# Patient Record
Sex: FEMALE | Race: WHITE | NOT HISPANIC OR LATINO | Employment: FULL TIME | URBAN - METROPOLITAN AREA
[De-identification: names, ages, dates, MRNs, and addresses within clinical notes are randomized per-mention and may not be internally consistent; named-entity substitution may affect disease eponyms.]

---

## 2018-01-11 NOTE — PROGRESS NOTES
Assessment    1  Accelerated essential hypertension (401 0) (I10)    Plan  Accelerated essential hypertension    · A diet low in sodium and high in potassium, magnesium, and calcium can help your  blood pressure ; Status:Complete;   Done: 04PAL5179   · Begin a limited exercise program ; Status:Complete;   Done: 49BWD8284   · Continue with our present treatment plan ; Status:Complete;   Done: 34KFT9212   · Keep a diary of when and what you eat ; Status:Complete;   Done: 82RWQ1615   · Restrict the salt in your diet by avoiding highly salted foods ; Status:Complete;   Done:  49GVK7727   · Restrict your sodium (salt) intake to 2 grams per day ; Status:Complete;   Done:  83HFC4891   · Some eating tips that can help you lose weight ; Status:Complete;   Done: 80HNI3309   · Take your blood pressure twice a week  Record the numbers and bring them with you to  your appointments ; Status:Complete;   Done: 17BZT3258   · We recommend that you change your eating habits slowly ; Status:Complete;   Done:  13TTB7680   · We recommend that you follow the "Mediterranean diet "; Status:Complete;   Done:  63CVC4985   · You need to stop smoking  Though it is not easy, more than half of all adult smokers  have quit  We encourage you to write down all the reasons you should quit smoking and  set a quit date for yourself  Ask us how we can help  You may also call  1-800-QUIT-NOW for free resources and assistance ; Status:Complete;   Done:  81USO5318   · Call [phone] if: You become dizzy or lightheaded, especially when you stand up  after sitting for a while ; Status:Complete;   Done: 86KOF7606   · Call [phone] if: You develop double vision (see two of everything) ;  Status:Complete;   Done: 13IZD2059   · Call [phone] if: Your blood pressure is frequently higher than 140/90 ;  Status:Complete;   Done: 56FCO3395   · Call 911 if:  You experience a new kind of chest pain (angina) or pressure ;  Status:Complete;   Done: 91WCN8557   · Call 911 if: You have any symptoms of a stroke ; Status:Complete;   Done: 66VCV3779   · Seek Immediate Medical Attention if: You have a severe headache that will not go away ;  Status:Complete;   Done: 71KLS2967   · Seek Immediate Medical Attention if: Your blood pressure is greater than 250/120 for 2  consecutive readings ; Status:Complete;   Done: 09VEC4362    Discussion/Summary    For elevated blood pressure: you need to take your blood pressure regularly  Please take as soon as you get home today  Please check your blood pressure daily  Keep a log of your readings and bring them to you next appointment  Pt reports she does have a BP cuff at home and can take her BP  Please seek immediate medical attention for readings greater than 190 on the top and greater than 110 on the bottom  The patient was counseled regarding instructions for management, risk factor reductions, prognosis, patient and family education, impressions, risks and benefits of treatment options, importance of compliance with treatment  Possible side effects of new medications were reviewed with the patient/guardian today  The treatment plan was reviewed with the patient/guardian  The patient/guardian understands and agrees with the treatment plan      Provider Comments  Provider Comments:   Pt appears to be very inconsistent with taking medication, has many excuses why she forgets them  Educated on risk factors of uncontrolled HTN  Chief Complaint  Pt states that on Sunday, she had a "rush of warmth" , then shortly after vomited and did not feel good for the rest of the day -she was been feeling more hungry than usual, that she cant get full, and doesn't feel normal until about 2-3 hours after eating  She states that this is not normal for her  Overall, she states that she just doesn't feel right  History of Present Illness  HPI: Pt is here for HTN   She has been taking her meds for the past week after not taking them for some time  She did have an episode 5 days ago where she felt flushed and vomited  She has not any further episodes  She has been trying to reduce her caffeine intake  She is feeling better today but is concerned regarding her blood pressure  She does admit though that she has not taken her BP medication today  She notes she has been feeling much more hungry lately  Hypertension (Follow-Up): The patient presents for follow-up of essential hypertension  The patient states she has been doing poorly with her blood pressure control since the last visit  She has no comorbid illnesses  Symptoms: The patient is currently asymptomatic  Associated symptoms include headache and occasional HAs and flushing, but no focal neurologic deficits and no memory loss  Home monitoring: The patient is not checking blood pressure at home  Disease Management: the patient is not doing well with her blood pressure goals  Review of Systems    Constitutional: no fever, not feeling poorly, no chills and not feeling tired  ENT: no earache, no nosebleeds, no sore throat and no nasal discharge  Cardiovascular: no chest pain, no palpitations and no lower extremity edema  Respiratory: no shortness of breath and no cough  Gastrointestinal: vomiting, but as noted in HPI, no abdominal pain, no nausea, no constipation, no diarrhea and no blood in stools  Genitourinary: no dysuria  Musculoskeletal: no arthralgias and no myalgias  Integumentary: no rashes  Neurological: headache and occasional headache, but no numbness, no dizziness and no fainting  Active Problems    1  Accelerated essential hypertension (401 0) (I10)   2  Benign essential hypertension (401 1) (I10)   3  HTN (hypertension), malignant (401 0) (I10)   4  Hypercholesterolemia (272 0) (E78 0)   5  Iron deficiency anemia (280 9) (D50 9)   6   Tinea versicolor (111 0) (B36 0)    Past Medical History  Active Problems And Past Medical History Reviewed: The active problems and past medical history were reviewed and updated today  Surgical History  Surgical History Reviewed: The surgical history was reviewed and updated today  Social History    · Being A Social Drinker   · Denied: History of Drug Use   · Never A Smoker  The social history was reviewed and is unchanged  Family History  Family History Reviewed: The family history was reviewed and updated today  Current Meds   1  [employer] Vitamin C 500 MG Oral Tablet; Therapy: (Recorded:12Nov2013) to Recorded   2  Ferrous Sulfate 325 (65 Fe) MG Oral Tablet; TAKE 1 TABLET EVERY OTHER DAY WITH   FOOD  Requested for: 06RDH0493; Last Rx:22Jan2016 Ordered   3  Fluticasone Propionate 50 MCG/ACT Nasal Suspension; USE 1 TO 2 SPRAYS IN EACH   NOSTRIL ONCE DAILY; Therapy: 23BAC6823 to (Last Sylviath Maizes)  Requested for: 91NZE6152 Ordered   4  Lisinopril 40 MG Oral Tablet; TAKE 1 TABLET DAILY  Requested for: 68YQE0789; Last   Rx:22Jan2016 Ordered   5  Metoprolol Succinate ER 50 MG Oral Tablet Extended Release 24 Hour; TAKE 1 TABLET   DAILY; Therapy: 94XON9618 to (Coral Portillo)  Requested for: 35GSI6667; Last   Rx:27Nov2015 Ordered   6  Multivital-M Oral Tablet; Therapy: (Recorded:12Nov2013) to Recorded   7  Pantoprazole Sodium 20 MG Oral Tablet Delayed Release; Take 1 tablet by mouth once   a day,,;   Therapy: 33FXH7984 to (Coral Portillo)  Requested for: 52BSR0449; Last   Rx:27Nov2015 Ordered    The medication list was reviewed and updated today  Allergies    1  No Known Drug Allergies    2   Seasonal    Vitals   Recorded: 66HWF8519 05:01PM Recorded: 97RVO2227 04:36PM   Temperature  98 2 F, Oral   Heart Rate  66   Systolic 816, LUE, Sitting 152, LUE, Sitting   Diastolic 134, LUE, Sitting 100, LUE, Sitting   Height  5 ft 7 in   Weight  167 lb    BMI Calculated  26 16   BSA Calculated  1 87   O2 Saturation  99, RA     Physical Exam    Constitutional   General appearance: No acute distress, well appearing and well nourished  Eyes   Conjunctiva and lids: No swelling, erythema or discharge  Pupils and irises: Equal, round and reactive to light  Ears, Nose, Mouth, and Throat   External inspection of ears and nose: Normal     Oropharynx: Normal with no erythema, edema, exudate or lesions  MMM  Pulmonary   Respiratory effort: No increased work of breathing or signs of respiratory distress  Auscultation of lungs: Clear to auscultation  no rales or crackles were heard bilaterally  no rhonchi  no wheezing  Cardiovascular   Auscultation of heart: Normal rate and rhythm, normal S1 and S2, without murmurs  Examination of extremities for edema and/or varicosities: Normal     Abdomen   Abdomen: Non-tender, no masses  Lymphatic   Palpation of lymph nodes in neck: No lymphadenopathy  no anterior cervical node enlargement, no posterior cervical node enlargement, no submandibular node enlargement and no supraclavicular node enlargement  Musculoskeletal   Gait and station: Normal     Skin   Skin and subcutaneous tissue: Normal without rashes or lesions  Neurologic   Cranial nerves: Cranial nerves 2-12 intact  No focal sensory or motor deficits, CN 2-12 intact  Psychiatric   Orientation to person, place, and time: Normal     Mood and affect: Normal          Future Appointments    Date/Time Provider Specialty Site   03/14/2016 05:30 PM Rodrigo Jean MD Internal Medicine [facility]     Signatures   Electronically signed by :  GERARDO Chan; Feb 4 2016  6:37PM EST                       (Author)    Electronically signed by : Irma Briggs MD; Feb 5 2016  8:54AM EST                       (Validation)

## 2018-01-11 NOTE — PROGRESS NOTES
Assessment    1  Accelerated essential hypertension (401 0) (I10)   2  Iron deficiency anemia (280 9) (D50 9)   3  Screening for depression (V79 0) (Z13 89)   4  Hypercholesterolemia (272 0) (E78 0)    Plan  Benign essential hypertension    · Lisinopril 40 MG Oral Tablet; TAKE 1 TABLET DAILY  Hypercholesterolemia    · Follow-up visit in 1 month Evaluation and Treatment  Follow-up  Status: Hold For - Scheduling   Requested for: 21Jan2016  Iron deficiency anemia    · Ferrous Sulfate 325 (65 Fe) MG Oral Tablet; TAKE 1 TABLET EVERY OTHER DAY WITH  FOOD  Screening for depression    · *VB-Depression Screening; Status:Complete - Retrospective By Protocol Authorization;   Done:  88WBR6109 04:46PM    Discussion/Summary  Discussion Summary:   The patient was encouraged to maintain compliance with her medications  She was told to take her medications at once when she gets home from the office and to begin taking them on a regular basis every morning  She gives a myriad of excuses as to why she has been unable to take her medications  The patient was counseled as to the consequences of noncompliance with her blood pressure medications in the possible complications such as strokes heart attacks microvascular disease of the brain, peripheral vascular complications and the like  She will have a followup visit in one month to assess her blood pressure control  There were no other changes to her medications at this time  Chief Complaint  Chief Complaint Free Text Note Form: Pt  here to f/u HTN and anemia  review b/w results           History of Present Illness  HPI: Is returns to the office for followup visit for hypertension  She's had some adjustments to her medications but admits to be noncompliant with her medication  She denies any symptoms other than a very mild headache  She has no visual symptoms he denies any weakness, numbness, tingling of her extremities she denies any speech problem or facial asymmetry   She had some blood work done as followup for iron deficiency anemia which will be reviewed  Her hemoglobin was 11 5 with normocytic normochromic indices  Metabolic panel is normal fasting glucose was normal  Her lipid profile revealed a mildly elevated cholesterol at 231 and hypertriglyceridemia of 259      Review of Systems  PHQ-9 Depression Scale: Over the past 2 weeks, how often have you been bothered by the following problems? 1 ) Little interest or pleasure in doing things? Several days  2 ) Feeling down, depressed or hopeless? Not at all    3 ) Trouble falling asleep or sleeping too much? Several days  4 ) Feeling tired or having little energy? Nearly every day  5 ) Poor appetite or overeating? Several days  6 ) Feeling bad about yourself, or that you are a failure, or have let yourself or your family down? Not at all    7 ) Trouble concentrating on things, such as reading a newspaper or watching television? Several days  8 ) Moving or speaking so slowly that other people could have noticed, or the opposite, moving or speaking faster than usual? Several days  9 ) Thoughts that you would be better off dead or of hurting yourself in some way? 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)   5  History of Chest tightness or pressure (786 59) (R07 89)   6  History of Encounter for screening mammogram for malignant neoplasm of breast (V76 12) (Z12 31)   7  History of acute bronchitis (V12 69) (Z87 09)   8  History of allergy (V15 09) (Z88 9)   9   History of heartburn (V12 79) (Z87 898)   10  History of hypertension (V12 59) (Z86 79)   11  History of tinea corporis (V12 09) (Z86 19)   12  History of Influenza vaccination declined (V64 06) (Z28 21)   13  History of Screening for breast cancer (V76 10) (Z12 39)   14  History of Screening for lipoid disorders (V77 91) (Z13 220)  Active Problems And Past Medical History Reviewed: The active problems and past medical history were reviewed and updated today  Surgical History    1  History of Tonsillectomy With Adenoidectomy  Surgical History Reviewed: The surgical history was reviewed and updated today  Family History    1  Family history of Coronary Artery Disease (V17 49)    2  Family history of Coronary Artery Disease (V17 49)    3  Family history of Breast Cancer (V16 3)   4  Family history of Family Health Status 2  Children Living    5  Family history of Cardiovascular Disorder (V17 49)  Family History Reviewed: The family history was reviewed and updated today  Social History    · Being A Social Drinker   · Denied: History of Drug Use   · Never A Smoker  Social History Reviewed: The social history was reviewed and is unchanged  Current Meds   1  [employer]S Vitamin C 500 MG Oral Tablet; Therapy: (Recorded:12Nov2013) to Recorded   2  Ferrous Sulfate 325 (65 Fe) MG Oral Tablet; TAKE 1 TABLET EVERY OTHER DAY WITH FOOD; Last   Rx:24Mar2014 Ordered   3  Fluticasone Propionate 50 MCG/ACT Nasal Suspension; USE 1 TO 2 SPRAYS IN EACH NOSTRIL   ONCE DAILY; Therapy: 11BJI6782 to (Osmany North)  Requested for: 84GJQ1170 Ordered   4  Lisinopril 40 MG Oral Tablet; TAKE 1 TABLET DAILY  Requested for: 11Aug2015; Last Rx:11Aug2015   Ordered   5  Metoprolol Succinate ER 50 MG Oral Tablet Extended Release 24 Hour; TAKE 1 TABLET DAILY; Therapy: 79YIL5520 to (Dimitrios Mendez)  Requested for: 95MPG6500; Last Rx:27Nov2015   Ordered   6  Multivital-M Oral Tablet;    Therapy: (Recorded:12Nov2013) to Recorded   7  Pantoprazole Sodium 20 MG Oral Tablet Delayed Release; Take 1 tablet by mouth once a day,,;   Therapy: 86YWQ6046 to (Carlton Lazaro)  Requested for: 31KJA4067; Last Rx:27Nov2015   Ordered  Medication List Reviewed: The medication list was reviewed and updated today  Allergies    1  No Known Drug Allergies    2  No Known Environmental Allergies   3  No Known Food Allergies    Vitals  Vital Signs [Data Includes: Current Encounter]    Recorded: 21Jan2016 04:37PM   Temperature 98 2 F, Oral   Heart Rate 73   Systolic 642, LUE, Sitting   Diastolic 428, LUE, Sitting   Weight 168 lb    BMI Calculated 27 12   BSA Calculated 1 86   O2 Saturation 98     Physical Exam    Constitutional   General appearance: No acute distress, well appearing and well nourished  Eyes   Conjunctiva and lids: No swelling, erythema or discharge  Pupils and irises: Equal, round and reactive to light  Ears, Nose, Mouth, and Throat   External inspection of ears and nose: Normal     Pulmonary   Respiratory effort: No increased work of breathing or signs of respiratory distress  Auscultation of lungs: Clear to auscultation  Cardiovascular   Auscultation of heart: Normal rate and rhythm, normal S1 and S2, without murmurs  Examination of extremities for edema and/or varicosities: Normal     Carotid pulses: Normal     Abdomen   Abdomen: Non-tender, no masses  Lymphatic   Palpation of lymph nodes in neck: No lymphadenopathy  Musculoskeletal   Gait and station: Normal     Neurologic   Cranial nerves: Cranial nerves 2-12 intact  No focal sensory or motor deficits     Psychiatric   Orientation to person, place, and time: Normal     Mood and affect: Normal          Results/Data  Encounter Results   *VB-Depression Screening 21Jan2016 04:46PM Marybeth Schilder     Test Name Result Flag Reference   Depression Scale Result      Depression Screen - Positive Findings       Future Appointments    Date/Time Provider Specialty Site   03/14/2016 05:30 PM Augusto Gaines MD Internal Medicine [facility]     Signatures   Electronically signed by : Narciso Ceballos MD; Jan 21 2016  5:44PM EST                       (Author)

## 2018-01-31 ENCOUNTER — OFFICE VISIT (OUTPATIENT)
Dept: INTERNAL MEDICINE CLINIC | Facility: CLINIC | Age: 48
End: 2018-01-31
Payer: COMMERCIAL

## 2018-01-31 VITALS
OXYGEN SATURATION: 98 % | BODY MASS INDEX: 26.62 KG/M2 | TEMPERATURE: 97.6 F | SYSTOLIC BLOOD PRESSURE: 164 MMHG | DIASTOLIC BLOOD PRESSURE: 98 MMHG | WEIGHT: 169.6 LBS | HEIGHT: 67 IN | HEART RATE: 57 BPM

## 2018-01-31 DIAGNOSIS — I10 BENIGN ESSENTIAL HYPERTENSION: Primary | ICD-10-CM

## 2018-01-31 DIAGNOSIS — E78.00 HYPERCHOLESTEROLEMIA: ICD-10-CM

## 2018-01-31 DIAGNOSIS — D50.9 IRON DEFICIENCY ANEMIA, UNSPECIFIED IRON DEFICIENCY ANEMIA TYPE: ICD-10-CM

## 2018-01-31 DIAGNOSIS — B36.0 TINEA VERSICOLOR: ICD-10-CM

## 2018-01-31 PROCEDURE — 99214 OFFICE O/P EST MOD 30 MIN: CPT | Performed by: PHYSICIAN ASSISTANT

## 2018-01-31 RX ORDER — HYDROCHLOROTHIAZIDE 25 MG/1
25 TABLET ORAL DAILY
Qty: 30 TABLET | Refills: 1 | Status: SHIPPED | OUTPATIENT
Start: 2018-01-31 | End: 2018-04-05 | Stop reason: SDUPTHER

## 2018-01-31 RX ORDER — METOPROLOL SUCCINATE 50 MG/1
1 TABLET, EXTENDED RELEASE ORAL DAILY
COMMUNITY
Start: 2015-11-27 | End: 2018-01-31 | Stop reason: SDUPTHER

## 2018-01-31 RX ORDER — KETOCONAZOLE 20 MG/G
CREAM TOPICAL DAILY
Qty: 15 G | Refills: 0 | Status: SHIPPED | OUTPATIENT
Start: 2018-01-31 | End: 2018-04-05

## 2018-01-31 RX ORDER — LISINOPRIL 40 MG/1
1 TABLET ORAL DAILY
COMMUNITY
End: 2018-01-31 | Stop reason: SDUPTHER

## 2018-01-31 RX ORDER — LISINOPRIL 40 MG/1
40 TABLET ORAL DAILY
Qty: 30 TABLET | Refills: 0 | Status: SHIPPED | OUTPATIENT
Start: 2018-01-31 | End: 2018-03-31 | Stop reason: SDUPTHER

## 2018-01-31 RX ORDER — METOPROLOL SUCCINATE 50 MG/1
50 TABLET, EXTENDED RELEASE ORAL DAILY
Qty: 30 TABLET | Refills: 0 | Status: SHIPPED | OUTPATIENT
Start: 2018-01-31 | End: 2018-04-05 | Stop reason: SDUPTHER

## 2018-01-31 NOTE — ASSESSMENT & PLAN NOTE
BP check in 1 week  Discussed at length compliance with BP medications  Add HCTZ 25 mg daily along with lisinopril 40 mg daily and metoprolol 50 mg daily  Do not use any OTC medications which can increase Bp  Discussed red flag sx and ER precautions which need immediate eval and treat

## 2018-01-31 NOTE — PATIENT INSTRUCTIONS
Benign essential hypertension = BP check in 1 week  Discussed at length compliance with BP medications  Add HCTZ 25 mg daily along with lisinopril 40 mg daily and metoprolol 50 mg daily  Do not use any OTC medications which can increase Bp  Discussed red flag sx and ER precautions which need immediate eval and treat

## 2018-01-31 NOTE — PROGRESS NOTES
Assessment/Plan:    Hypercholesterolemia  No recent lipids, will check lipids to further evaluate  Tinea versicolor  Patient notes she previously required oral antifungals for treatment of tinea  Due to not having recent labs discussed that I will Rx topical however before I consider oral agents I first need her to go for labs to evaluate renal and liver function  Patient aware and plans to go for labs  Iron deficiency anemia  Prior hx of Fe deficient anemia, not currently on iron supplement, will start with CBC to evaluate for anemia or microcytosis  May need iron studies to follow  Benign essential hypertension  Accellerated HTN in office today  Compliance with oral BP agents is questionable  Prior difficulties with Norvasc causing edema  Reviewed medicaitons and discussed importnace of compliance and BP control with patient at length  Avoid OTC meds, ETOH which can increase Bp  [facility] BP check in 1 week  Add HCTZ 25 mg daily along with lisinopril 40 mg daily and metoprolol 50 mg daily  Go for labs to further evalute renal function  Will add UA as well  Discussed at length the risks of poorly controlled BP including CVA/retinopathy/nephropathy  At this time no focal findings  Discussed red flag sx and ER precautions which need immediate eval and treat  1 week [facility] follow up  Diagnoses and all orders for this visit:    Benign essential hypertension  -     hydrochlorothiazide (HYDRODIURIL) 25 mg tablet; Take 1 tablet (25 mg total) by mouth daily  -     Urinalysis with microscopic  -     Comprehensive metabolic panel; Future  -     TSH, 3rd generation with T4 reflex; Future  -     lisinopril (ZESTRIL) 40 mg tablet; Take 1 tablet (40 mg total) by mouth daily  -     metoprolol succinate (TOPROL-XL) 50 mg 24 hr tablet;  Take 1 tablet (50 mg total) by mouth daily  -     Comprehensive metabolic panel  -     TSH, 3rd generation with T4 reflex    Tinea versicolor  -     ketoconazole (NIZORAL) 2 % cream; Apply topically daily    Hypercholesterolemia  -     Lipid Panel with Direct LDL reflex; Future  -     Lipid Panel with Direct LDL reflex    Iron deficiency anemia, unspecified iron deficiency anemia type  -     CBC and differential; Future  -     CBC and differential    Other orders  -     Discontinue: metoprolol succinate (TOPROL-XL) 50 mg 24 hr tablet; Take 1 tablet by mouth daily  -     Discontinue: lisinopril (ZESTRIL) 40 mg tablet; Take 1 tablet by mouth daily          Subjective:          Patient ID: Viraj Alvarez is a 50 y o  female  49 yo feamle notes she has hx of tinea and gets it several times per year causing itchign on the area  Occurs throughout her body  Hx of poorly controlled BP for some time  Notes that she does not take BP meds often  Notes she did not take BP medications today  Notes she often forgets her BP medications and BP is always poorly controlled  Notes it has been much higher in the past without causing her any symptoms  Notes she was told in the past she is at increased risk due to her Bp  Has not had labs in some time  Needs refills of her meds, notes they last longer because she does not take them regularly  Feels fine at this time, no complaints or concerns  Hypertension   This is a chronic problem  The current episode started more than 1 year ago  The problem has been waxing and waning (changes pending her compliance with medications ) since onset  The problem is uncontrolled  Pertinent negatives include no anxiety, blurred vision, chest pain, headaches, malaise/fatigue, orthopnea, palpitations, peripheral edema (Noted prior was given norvasc which was d/c due to LE edema ) or shortness of breath  Associated agents: Notes she does not take OTC supplements or meds  Risk factors for coronary artery disease include family history and dyslipidemia  Past treatments include ACE inhibitors and beta blockers  Compliance problems: forgets to take them  There is no history of angina, kidney disease, CAD/MI, CVA, heart failure, retinopathy or a thyroid problem  There is no history of chronic renal disease  Rash   Chronicity: gets tinea on abdomen regularly  The current episode started more than 1 year ago  The problem has been waxing and waning since onset  The affected locations include the abdomen, chest and torso  The rash is characterized by itchiness  She was exposed to nothing  Pertinent negatives include no cough, diarrhea, fever, shortness of breath or vomiting  Treatments tried: Topical does not work  NOtes she needs oral meds to tx this  The treatment provided no relief  Her past medical history is significant for allergies (seasonal)  The following portions of the patient's history were reviewed and updated as appropriate: allergies, current medications, past family history, past medical history, past social history, past surgical history and problem list     Review of Systems   Constitutional: Negative for fever and malaise/fatigue  HENT: Positive for hearing loss (hx of decreased hearing L ear since prior surgery  )  Eyes: Negative for blurred vision and visual disturbance  Respiratory: Negative for cough, chest tightness, shortness of breath and wheezing  Cardiovascular: Negative for chest pain, palpitations and orthopnea  Gastrointestinal: Negative for abdominal pain, diarrhea, nausea and vomiting  Skin: Positive for rash (as noted in HPI)  Neurological: Negative for dizziness, syncope, speech difficulty, weakness, light-headedness, numbness and headaches  Psychiatric/Behavioral: Negative for agitation           Past Medical History:   Diagnosis Date    Benign neoplasm of parotid gland     left    Hypertension          Current Outpatient Prescriptions:     lisinopril (ZESTRIL) 40 mg tablet, Take 1 tablet (40 mg total) by mouth daily, Disp: 30 tablet, Rfl: 0    metoprolol succinate (TOPROL-XL) 50 mg 24 hr tablet, Take 1 tablet (50 mg total) by mouth daily, Disp: 30 tablet, Rfl: 0    hydrochlorothiazide (HYDRODIURIL) 25 mg tablet, Take 1 tablet (25 mg total) by mouth daily, Disp: 30 tablet, Rfl: 1    ketoconazole (NIZORAL) 2 % cream, Apply topically daily, Disp: 15 g, Rfl: 0    Allergies   Allergen Reactions    Other      seasonal       Social History   Past Surgical History:   Procedure Laterality Date     SECTION      EAR SURGERY      SALIVARY GLAND SURGERY      TONSILECTOMY AND ADNOIDECTOMY       Family History   Problem Relation Age of Onset    Coronary artery disease Mother      age 52    Coronary artery disease Father      age 63    Breast cancer Sister      age 52    Heart disease Family        Objective:  /98   Pulse 57   Temp 97 6 °F (36 4 °C) (Oral)   Ht 5' 6 5" (1 689 m)   Wt 76 9 kg (169 lb 9 6 oz)   SpO2 98%   BMI 26 96 kg/m²   Body mass index is 26 96 kg/m²  Physical Exam   Constitutional: She is oriented to person, place, and time  She appears well-developed and well-nourished  No distress  HENT:   Head: Normocephalic and atraumatic  Mouth/Throat: Oropharynx is clear and moist  No oropharyngeal exudate  Eyes: Pupils are equal, round, and reactive to light  Right eye exhibits no discharge  Left eye exhibits no discharge  No scleral icterus  Neck: Neck supple  Cardiovascular: Regular rhythm and normal heart sounds  No murmur heard  Bradycardic rate  Pulmonary/Chest: Effort normal and breath sounds normal  No respiratory distress  She has no wheezes  Abdominal: Soft  Bowel sounds are normal  There is no tenderness  There is no guarding  Musculoskeletal: She exhibits no edema  Neurological: She is alert and oriented to person, place, and time  No cranial nerve deficit  Coordination normal    No gross focal neuro deficits on exam   Skin: Skin is warm and dry  She is not diaphoretic    + hypopigmented patches throughout anterior chest, trunk below breasts  Puritic  Psychiatric: She has a normal mood and affect  Her behavior is normal  Thought content normal  Her mood appears not anxious  Her affect is not angry  Her speech is not rapid and/or pressured and not slurred  She is not agitated  She does not exhibit a depressed mood  Nursing note and vitals reviewed

## 2018-02-01 NOTE — ASSESSMENT & PLAN NOTE
Patient notes she previously required oral antifungals for treatment of tinea  Due to not having recent labs discussed that I will Rx topical however before I consider oral agents I first need her to go for labs to evaluate renal and liver function  Patient aware and plans to go for labs

## 2018-02-01 NOTE — ASSESSMENT & PLAN NOTE
Accellerated HTN in office today  Compliance with oral BP agents is questionable  Prior difficulties with Norvasc causing edema  Reviewed medicaitons and discussed importnace of compliance and BP control with patient at length  Avoid OTC meds, ETOH which can increase Bp  [facility] follow up BP check in 1 week  Add HCTZ 25 mg daily along with lisinopril 40 mg daily and metoprolol 50 mg daily  Go for labs to further evalute renal function  Will add UA as well  Discussed at length the risks of poorly controlled BP including CVA/retinopathy/nephropathy  At this time no focal findings  Discussed red flag sx and ER precautions which need immediate eval and treat  1 week [facility] follow up

## 2018-02-01 NOTE — ASSESSMENT & PLAN NOTE
Prior hx of Fe deficient anemia, not currently on iron supplement, will start with CBC to evaluate for anemia or microcytosis  May need iron studies to follow

## 2018-03-16 ENCOUNTER — TELEPHONE (OUTPATIENT)
Dept: INTERNAL MEDICINE CLINIC | Age: 48
End: 2018-03-16

## 2018-03-16 NOTE — TELEPHONE ENCOUNTER
[facility]  She is at work and on the phone  The best time to reach her would be 11:30-12:30 when she is on lunch break  Thank you

## 2018-03-23 DIAGNOSIS — I10 BENIGN ESSENTIAL HYPERTENSION: ICD-10-CM

## 2018-03-23 RX ORDER — LISINOPRIL 40 MG/1
40 TABLET ORAL DAILY
Qty: 30 TABLET | Refills: 0 | OUTPATIENT
Start: 2018-03-23

## 2018-03-23 NOTE — TELEPHONE ENCOUNTER
Automated refill request received for this patient  Please contact patient and    1  Verify that patient is taking this medication (verify with patient and with prior records)  2  Verify the patients dose for the medication  3   Verify that patient needs refills  Once this is done and if rx needed please resend me the rx  Please let me know if any questions      Thanks   Josiah Carvajal

## 2018-03-31 DIAGNOSIS — I10 BENIGN ESSENTIAL HYPERTENSION: ICD-10-CM

## 2018-04-03 RX ORDER — LISINOPRIL 40 MG/1
40 TABLET ORAL DAILY
Qty: 30 TABLET | Refills: 0 | Status: SHIPPED | OUTPATIENT
Start: 2018-04-03 | End: 2018-04-05 | Stop reason: SDUPTHER

## 2018-04-05 ENCOUNTER — OFFICE VISIT (OUTPATIENT)
Dept: INTERNAL MEDICINE CLINIC | Facility: CLINIC | Age: 48
End: 2018-04-05
Payer: COMMERCIAL

## 2018-04-05 VITALS
SYSTOLIC BLOOD PRESSURE: 136 MMHG | HEIGHT: 67 IN | HEART RATE: 50 BPM | WEIGHT: 170.4 LBS | BODY MASS INDEX: 26.74 KG/M2 | DIASTOLIC BLOOD PRESSURE: 80 MMHG | TEMPERATURE: 97.3 F | OXYGEN SATURATION: 97 %

## 2018-04-05 DIAGNOSIS — Z12.31 ENCOUNTER FOR SCREENING MAMMOGRAM FOR MALIGNANT NEOPLASM OF BREAST: ICD-10-CM

## 2018-04-05 DIAGNOSIS — E78.5 DYSLIPIDEMIA (HIGH LDL; LOW HDL): ICD-10-CM

## 2018-04-05 DIAGNOSIS — I10 BENIGN ESSENTIAL HYPERTENSION: Primary | ICD-10-CM

## 2018-04-05 DIAGNOSIS — B36.0 TINEA VERSICOLOR: ICD-10-CM

## 2018-04-05 DIAGNOSIS — D50.9 MICROCYTIC ANEMIA: ICD-10-CM

## 2018-04-05 DIAGNOSIS — R20.2 PARESTHESIA: ICD-10-CM

## 2018-04-05 PROCEDURE — 1036F TOBACCO NON-USER: CPT | Performed by: PHYSICIAN ASSISTANT

## 2018-04-05 PROCEDURE — 3075F SYST BP GE 130 - 139MM HG: CPT | Performed by: PHYSICIAN ASSISTANT

## 2018-04-05 PROCEDURE — 99214 OFFICE O/P EST MOD 30 MIN: CPT | Performed by: PHYSICIAN ASSISTANT

## 2018-04-05 PROCEDURE — 3079F DIAST BP 80-89 MM HG: CPT | Performed by: PHYSICIAN ASSISTANT

## 2018-04-05 RX ORDER — HYDROCHLOROTHIAZIDE 25 MG/1
25 TABLET ORAL DAILY
Qty: 30 TABLET | Refills: 2 | Status: SHIPPED | OUTPATIENT
Start: 2018-04-05 | End: 2018-07-15 | Stop reason: SDUPTHER

## 2018-04-05 RX ORDER — FERROUS SULFATE TAB EC 324 MG (65 MG FE EQUIVALENT) 324 (65 FE) MG
324 TABLET DELAYED RESPONSE ORAL
Qty: 30 TABLET | Refills: 2 | Status: SHIPPED | OUTPATIENT
Start: 2018-04-05 | End: 2019-03-20

## 2018-04-05 RX ORDER — METOPROLOL SUCCINATE 50 MG/1
50 TABLET, EXTENDED RELEASE ORAL DAILY
Qty: 30 TABLET | Refills: 2 | Status: SHIPPED | OUTPATIENT
Start: 2018-04-05 | End: 2018-08-28 | Stop reason: SDUPTHER

## 2018-04-05 RX ORDER — LISINOPRIL 40 MG/1
40 TABLET ORAL DAILY
Qty: 30 TABLET | Refills: 2 | Status: SHIPPED | OUTPATIENT
Start: 2018-04-05 | End: 2019-03-18 | Stop reason: SDUPTHER

## 2018-04-05 RX ORDER — ASCORBIC ACID 500 MG
500 TABLET ORAL DAILY
Qty: 30 TABLET | Refills: 2 | Status: SHIPPED | OUTPATIENT
Start: 2018-04-05 | End: 2019-03-20

## 2018-04-05 RX ORDER — FLUCONAZOLE 100 MG/1
100 TABLET ORAL DAILY
Qty: 7 TABLET | Refills: 0 | Status: SHIPPED | OUTPATIENT
Start: 2018-04-05 | End: 2018-04-12

## 2018-04-05 NOTE — ASSESSMENT & PLAN NOTE
Labs reviewed  Since last visit blood pressure is much improved with the addition of hydrochlorothiazide to patient's lisinopril 40 mg daily and metoprolol 50 mg daily  Continue same meds, no BP med changes at this time      Continue low-salt diet

## 2018-04-05 NOTE — ASSESSMENT & PLAN NOTE
Patient has not recently had a mammogram   Discussed at length the importance of screening mammogram   Patient also has a sister with a history of breast cancer  Which is another reason why it is very important patient gets her mammogram due to a family history  Order given

## 2018-04-05 NOTE — PROGRESS NOTES
[facility]  Standard Office Visit  Patient ID: Eunice Georgia    : 1970  Age/Gender: 50 y o  female     DATE: 2018      Assessment/Plan:    Dyslipidemia (high LDL; low HDL)   Reviewed lipids  ASCVD score 2 9% does not need statin at this time  Encouraged low-fat low-cholesterol diet    Benign essential hypertension   Labs reviewed  Since last visit blood pressure is much improved with the addition of hydrochlorothiazide to patient's lisinopril 40 mg daily and metoprolol 50 mg daily  Continue same meds, no BP med changes at this time  Continue low-salt diet    Microcytic anemia  Advised her to schedule GYN followup due to irregular menses, discussed likely due to perimenopausal period  Will check N75 and folic acid along with iron panel and CBC to be done in next 3 weeks  Encounter for screening mammogram for malignant neoplasm of breast   Patient has not recently had a mammogram   Discussed at length the importance of screening mammogram   Patient also has a sister with a history of breast cancer  Which is another reason why it is very important patient gets her mammogram due to a family history  Order given  Tinea versicolor  Start diflucan 100mg daily for 7 days  LFTs normal on last lab set  Diagnoses and all orders for this visit:    Benign essential hypertension  -     hydrochlorothiazide (HYDRODIURIL) 25 mg tablet; Take 1 tablet (25 mg total) by mouth daily  -     lisinopril (ZESTRIL) 40 mg tablet; Take 1 tablet (40 mg total) by mouth daily  -     metoprolol succinate (TOPROL-XL) 50 mg 24 hr tablet; Take 1 tablet (50 mg total) by mouth daily    Encounter for screening mammogram for malignant neoplasm of breast  -     Mammo screening bilateral w cad; Future    Microcytic anemia  -     Iron Panel; Future  -     Ferritin; Future  -     CBC and differential; Future  -     ferrous sulfate 324 (65 Fe) mg;  Take 1 tablet (324 mg total) by mouth daily before breakfast  -     ascorbic acid (VITAMIN C) 500 MG tablet; Take 1 tablet (500 mg total) by mouth daily  -     Vitamin B12; Future  -     Folate; Future    Dyslipidemia (high LDL; low HDL)    Tinea versicolor  -     fluconazole (DIFLUCAN) 100 mg tablet; Take 1 tablet (100 mg total) by mouth daily for 7 days    Paresthesia  -     Vitamin B12; Future  -     Folate; Future          Subjective:   Chief Complaint   Patient presents with    Follow-up     review blood work    Hypertension         Steven Alegre is a 50 y o  female who presents to the office on 4/5/2018 for     75-year-old female for follow-up  Since last visit  Has been taking her blood pressure medications as directed  Here today to discuss blood pressure    Notes since last visit she went for her labs  She is not currently taking iron supplementation but previously had been  She notes she may be due for her gyn follow-up complaints to reschedule  Menses have become somewhat more irregular as of late  She notes that she works as a    History of a previous fracture to her right wrist   Notes that she does get intermittent numbness and tingling in her fingers which come and go  No weakness associated with it  Does not wake her up from her sleep at night  Last occurred several days ago only last moments and improves with  Hand repositioning  Hypertension   This is a chronic problem  The current episode started more than 1 year ago  The problem has been gradually improving since onset  The problem is controlled  Pertinent negatives include no blurred vision, chest pain, palpitations, peripheral edema or shortness of breath  There are no associated agents to hypertension  Past treatments include ACE inhibitors and beta blockers  The current treatment provides moderate improvement  There is no history of angina, kidney disease or CAD/MI         The following portions of the patient's history were reviewed and updated as appropriate: allergies, current medications, past family history, past medical history, past social history, past surgical history and problem list     Review of Systems   Eyes: Negative for blurred vision  Respiratory: Negative for cough, shortness of breath and wheezing  Cardiovascular: Negative for chest pain and palpitations  Gastrointestinal: Negative for abdominal distention, constipation, diarrhea, nausea and vomiting  Musculoskeletal:        Numbness and tingling in her index and thumb bilateral hands intermittent worse with increased activity  Skin:        Note she has a history of tinea versicolor  Tried several topical medications with limited success  Needs oral medications which she had used in the past    Neurological: Negative for dizziness           Patient Active Problem List   Diagnosis    Benign essential hypertension    Microcytic anemia    Tinea versicolor    Dyslipidemia (high LDL; low HDL)    Encounter for screening mammogram for malignant neoplasm of breast       Past Medical History:   Diagnosis Date    Benign neoplasm of parotid gland     left    Hypertension        Past Surgical History:   Procedure Laterality Date     SECTION      EAR SURGERY      SALIVARY GLAND SURGERY      TONSILECTOMY AND ADNOIDECTOMY           Current Outpatient Prescriptions:     hydrochlorothiazide (HYDRODIURIL) 25 mg tablet, Take 1 tablet (25 mg total) by mouth daily, Disp: 30 tablet, Rfl: 2    lisinopril (ZESTRIL) 40 mg tablet, Take 1 tablet (40 mg total) by mouth daily, Disp: 30 tablet, Rfl: 2    metoprolol succinate (TOPROL-XL) 50 mg 24 hr tablet, Take 1 tablet (50 mg total) by mouth daily, Disp: 30 tablet, Rfl: 2    ascorbic acid (VITAMIN C) 500 MG tablet, Take 1 tablet (500 mg total) by mouth daily, Disp: 30 tablet, Rfl: 2    ferrous sulfate 324 (65 Fe) mg, Take 1 tablet (324 mg total) by mouth daily before breakfast, Disp: 30 tablet, Rfl: 2   fluconazole (DIFLUCAN) 100 mg tablet, Take 1 tablet (100 mg total) by mouth daily for 7 days, Disp: 7 tablet, Rfl: 0    Allergies   Allergen Reactions    Other      seasonal       Social History     Social History    Marital status: /Civil Union     Spouse name: N/A    Number of children: N/A    Years of education: N/A     Social History Main Topics    Smoking status: Never Smoker    Smokeless tobacco: None    Alcohol use Yes      Comment: social    Drug use: No    Sexual activity: Not Asked     Other Topics Concern    None     Social History Narrative    None       Family History   Problem Relation Age of Onset    Coronary artery disease Mother      age 52    Coronary artery disease Father      age 61    Breast cancer Sister      age 52    Heart disease Family        PHQ-9 Depression Screening    PHQ-9:    Frequency of the following problems over the past two weeks:       Little interest or pleasure in doing things:  0 - not at all  Feeling down, depressed, or hopeless:  0 - not at all  PHQ-2 Score:  0         Health Maintenance   Topic Date Due    HIV SCREENING  1970    MAMMOGRAM  1970    PAP SMEAR  1970    Depression Screening PHQ-9  11/08/1980    DTaP,Tdap,and Td Vaccines (1 - Tdap) 11/08/1989    INFLUENZA VACCINE  09/01/2017       There is no immunization history for the selected administration types on file for this patient  Objective:  Vitals:    04/05/18 1756   BP: 136/80   BP Location: Left arm   Patient Position: Sitting   Cuff Size: Adult   Pulse: (!) 50   Temp: (!) 97 3 °F (36 3 °C)   TempSrc: Oral   SpO2: 97%   Weight: 77 3 kg (170 lb 6 4 oz)   Height: 5' 7" (1 702 m)     Wt Readings from Last 3 Encounters:   04/05/18 77 3 kg (170 lb 6 4 oz)   01/31/18 76 9 kg (169 lb 9 6 oz)   11/07/16 77 3 kg (170 lb 6 1 oz)     Body mass index is 26 69 kg/m²  No exam data present       Physical Exam   Constitutional: She is oriented to person, place, and time   She appears well-developed and well-nourished  No distress  Alert pleasant cooperative seated in room in no acute distress   HENT:   Head: Normocephalic and atraumatic  Right Ear: External ear normal    Mouth/Throat: Oropharynx is clear and moist  No oropharyngeal exudate  Postsurgical changes left  TM   Eyes: Conjunctivae are normal  Right eye exhibits no discharge  Left eye exhibits no discharge  No scleral icterus  Neck: Neck supple  Cardiovascular: Normal rate, regular rhythm and normal heart sounds  No murmur heard  Pulmonary/Chest: Effort normal and breath sounds normal  No respiratory distress  She has no wheezes  Clear to auscultation no crackles rhonchi or wheeze no respiratory distress   Abdominal: Soft  Bowel sounds are normal  She exhibits no distension  There is no tenderness  Musculoskeletal: She exhibits no edema  No gross focal neurologic deficits on exam   Symmetric  strength  Negative Phalen's  Negative Tinel's  Neurological: She is alert and oriented to person, place, and time  Skin: Skin is warm and dry  She is not diaphoretic  Hyperpigmented patches on trunk  Scattered diffuse   Psychiatric: She has a normal mood and affect  Her behavior is normal  Judgment and thought content normal    Nursing note and vitals reviewed            Future Appointments  Date Time Provider Briseida Olson   7/17/2018 5:30 PM Desirae Garcia PA-C [facility] [address]    Patient Care Team:  Yoel Ellis DO as PCP - General

## 2018-04-05 NOTE — ASSESSMENT & PLAN NOTE
Reviewed lipids  ASCVD score 2 9% does not need statin at this time    Encouraged low-fat low-cholesterol diet

## 2018-04-05 NOTE — PATIENT INSTRUCTIONS
Dyslipidemia (high LDL; low HDL)  ASCVD score 2 9% does not need statin at this time  Benign essential hypertension  BP improved at this time on medications  Improved with addition of HCTZ to lisinopril and metoprolol  NO med changed at this time  Continue same meds  Microcytic anemia  Advised her to schedule GYN followup due to irregular menses, discussed likely due to perimenopausal period  Will check G94 and folic acid along with iron panel and CBC to be done in next 3 weeks  Encounter for screening mammogram for malignant neoplasm of breast  Discussed importance of mammogram screening as her sister has hx of breast cancer  Tinea versicolor  Start diflucan 100mg daily for 7 days  LFTs normal on last lab set

## 2018-04-05 NOTE — ASSESSMENT & PLAN NOTE
Advised her to schedule GYN followup due to irregular menses, discussed likely due to perimenopausal period  Will check B03 and folic acid along with iron panel and CBC to be done in next 3 weeks

## 2018-04-08 DIAGNOSIS — I10 BENIGN ESSENTIAL HYPERTENSION: ICD-10-CM

## 2018-04-09 RX ORDER — HYDROCHLOROTHIAZIDE 25 MG/1
25 TABLET ORAL DAILY
Qty: 30 TABLET | Refills: 1 | Status: SHIPPED | OUTPATIENT
Start: 2018-04-09 | End: 2019-03-18 | Stop reason: SDUPTHER

## 2018-04-25 ENCOUNTER — TELEPHONE (OUTPATIENT)
Dept: INTERNAL MEDICINE CLINIC | Facility: CLINIC | Age: 48
End: 2018-04-25

## 2018-04-25 NOTE — TELEPHONE ENCOUNTER
Left message for patient  Requested pt to have av flores call office ASAP  We have been trying to reach him by phone without success

## 2018-04-25 NOTE — TELEPHONE ENCOUNTER
This patient was returning your phone call  She stated that the best time to reach her would be after 5 pm  You can also leave a detailed message on her voice mail as well   The best number to reach her at is [phone]

## 2018-07-15 DIAGNOSIS — I10 BENIGN ESSENTIAL HYPERTENSION: ICD-10-CM

## 2018-07-20 RX ORDER — HYDROCHLOROTHIAZIDE 25 MG/1
25 TABLET ORAL DAILY
Qty: 30 TABLET | Refills: 2 | Status: SHIPPED | OUTPATIENT
Start: 2018-07-20 | End: 2019-03-20 | Stop reason: SDUPTHER

## 2018-08-14 DIAGNOSIS — I10 BENIGN ESSENTIAL HYPERTENSION: ICD-10-CM

## 2018-08-27 RX ORDER — LISINOPRIL 40 MG/1
TABLET ORAL
Qty: 30 TABLET | Refills: 2 | OUTPATIENT
Start: 2018-08-27

## 2018-08-27 RX ORDER — METOPROLOL SUCCINATE 50 MG/1
TABLET, EXTENDED RELEASE ORAL
Qty: 30 TABLET | Refills: 2 | OUTPATIENT
Start: 2018-08-27

## 2018-08-28 DIAGNOSIS — I10 BENIGN ESSENTIAL HYPERTENSION: ICD-10-CM

## 2018-08-28 RX ORDER — METOPROLOL SUCCINATE 50 MG/1
50 TABLET, EXTENDED RELEASE ORAL DAILY
Qty: 30 TABLET | Refills: 0 | Status: SHIPPED | OUTPATIENT
Start: 2018-08-28 | End: 2019-03-18 | Stop reason: SDUPTHER

## 2018-08-28 NOTE — TELEPHONE ENCOUNTER
30 day supply sent to pharmacy  Please call and have pt schedule f/u appt  Lab order in chart  Thank you!

## 2018-08-28 NOTE — TELEPHONE ENCOUNTER
Last appt, 4/5/18    Next appt, none pending    Pt requesting a 1M supply  She indicated that she needs to have her labs done and come in for f/u

## 2018-09-22 DIAGNOSIS — I10 BENIGN ESSENTIAL HYPERTENSION: ICD-10-CM

## 2018-09-24 RX ORDER — HYDROCHLOROTHIAZIDE 25 MG/1
25 TABLET ORAL DAILY
Qty: 30 TABLET | Refills: 1 | OUTPATIENT
Start: 2018-09-24

## 2018-09-26 DIAGNOSIS — I10 BENIGN ESSENTIAL HYPERTENSION: ICD-10-CM

## 2018-09-27 RX ORDER — METOPROLOL SUCCINATE 50 MG/1
TABLET, EXTENDED RELEASE ORAL
Qty: 30 TABLET | Refills: 0 | OUTPATIENT
Start: 2018-09-27

## 2019-03-11 ENCOUNTER — OFFICE VISIT (OUTPATIENT)
Dept: URGENT CARE | Age: 49
End: 2019-03-11
Payer: COMMERCIAL

## 2019-03-11 VITALS
HEIGHT: 65 IN | WEIGHT: 180 LBS | BODY MASS INDEX: 29.99 KG/M2 | OXYGEN SATURATION: 99 % | DIASTOLIC BLOOD PRESSURE: 132 MMHG | RESPIRATION RATE: 18 BRPM | HEART RATE: 94 BPM | SYSTOLIC BLOOD PRESSURE: 220 MMHG | TEMPERATURE: 97.8 F

## 2019-03-11 DIAGNOSIS — B36.0 TINEA VERSICOLOR: ICD-10-CM

## 2019-03-11 DIAGNOSIS — R21 RASH: Primary | ICD-10-CM

## 2019-03-11 PROCEDURE — G0382 LEV 3 HOSP TYPE B ED VISIT: HCPCS | Performed by: NURSE PRACTITIONER

## 2019-03-11 RX ORDER — FLUCONAZOLE 100 MG/1
100 TABLET ORAL DAILY
Qty: 7 TABLET | Refills: 0 | Status: SHIPPED | OUTPATIENT
Start: 2019-03-11 | End: 2019-03-20

## 2019-03-11 RX ORDER — METOPROLOL SUCCINATE 50 MG/1
TABLET, EXTENDED RELEASE ORAL
COMMUNITY
End: 2019-03-20 | Stop reason: SDUPTHER

## 2019-03-11 RX ORDER — VALSARTAN AND HYDROCHLOROTHIAZIDE 320; 25 MG/1; MG/1
TABLET, FILM COATED ORAL
COMMUNITY
End: 2019-03-20

## 2019-03-11 RX ORDER — OSELTAMIVIR PHOSPHATE 75 MG/1
CAPSULE ORAL
Refills: 0 | COMMUNITY
Start: 2019-01-15 | End: 2019-03-20

## 2019-03-11 RX ORDER — PREDNISONE 50 MG/1
50 TABLET ORAL DAILY
Qty: 7 TABLET | Refills: 0 | Status: SHIPPED | OUTPATIENT
Start: 2019-03-11 | End: 2019-03-20

## 2019-03-11 NOTE — PATIENT INSTRUCTIONS
Medication as prescribed / discussed  Continue to monitor blood pressure and proceed IMMEDIATELY to ED with any concerning symptoms as discussed  Contact PCP for follow-up  Urticaria   AMBULATORY CARE:   Urticaria  is also called hives  Hives can change size and shape, and appear anywhere on your skin  They can be mild or severe and last from a few minutes to a few days  Hives may be a sign of a severe allergic reaction called anaphylaxis that needs immediate treatment  Urticaria that lasts longer than 6 weeks may be a chronic condition that needs long-term treatment  Call 911 for signs or symptoms of anaphylaxis,  such as trouble breathing, swelling in your mouth or throat, or wheezing  You may also have itching, a rash, or feel like you are going to faint  Seek care immediately if:   · Your heart is beating faster than it normally does  · You have cramping or severe pain in your abdomen  Contact your healthcare provider if:   · You have a fever  · Your skin still itches 24 hours after you take your medicine  · You still have hives after 7 days  · Your joints are painful and swollen  · You have questions or concerns about your condition or care  Steps to take for signs or symptoms of anaphylaxis:   · Immediately  give 1 shot of epinephrine only into the outer thigh muscle  · Leave the shot in place  as directed  Your healthcare provider may recommend you leave it in place for up to 10 seconds before you remove it  This helps make sure all of the epinephrine is delivered  · Call 911 and go to the emergency department,  even if the shot improved symptoms  Do not drive yourself  Bring the used epinephrine shot with you  Treatment for mild urticaria  may not be needed  Chronic urticaria may need to be treated with more than one medicine, or other medicines than listed below   The following are common medicines used to treat urticaria:  · Antihistamines  decrease mild symptoms such as itching or a rash  · Steroids  decrease redness, pain, and swelling  · Epinephrine  is used to treat severe allergic reactions such as anaphylaxis  Safety precautions to take if you are at risk for anaphylaxis:   · Keep 2 shots of epinephrine with you at all times  You may need a second shot, because epinephrine only works for about 20 minutes and symptoms may return  Your healthcare provider can show you and family members how to give the shot  Check the expiration date every month and replace it before it expires  · Create an action plan  Your healthcare provider can help you create a written plan that explains the allergy and an emergency plan to treat a reaction  The plan explains when to give a second epinephrine shot if symptoms return or do not improve after the first  Give copies of the action plan and emergency instructions to family members, work and school staff, and  providers  Show them how to give a shot of epinephrine  · Be careful when you exercise  If you have had exercise-induced anaphylaxis, do not exercise right after you eat  Stop exercising right away if you start to develop any signs or symptoms of anaphylaxis  You may first feel tired, warm, or have itchy skin  Hives, swelling, and severe breathing problems may develop if you continue to exercise  · Carry medical alert identification  Wear medical alert jewelry or carry a card that explains the allergy  Ask your healthcare provider where to get these items  · Keep a record of triggers and symptoms  Record everything you eat, drink, or apply to your skin for 3 weeks  Include stressful events and what you were doing right before your hives started  Bring the record with you to follow-up visits with your healthcare provider  Manage urticaria:   · Cool your skin  This may help decrease itching  Apply a cool pack to your hives  Dip a hand towel in cool water, wring it out, and place it on your hives   You may also soak your skin in a cool oatmeal bath  · Do not rub your hives  This can irritate your skin and cause more hives  · Wear loose clothing  Tight clothes may irritate your skin and cause more hives  · Manage stress  Stress may trigger hives, or make them worse  Learn new ways to relax, such as deep breathing  Follow up with your healthcare provider as directed:  Write down your questions so you remember to ask them during your visits  © 2017 [employer] Information is for End User's use only and may not be sold, redistributed or otherwise used for commercial purposes  All illustrations and images included in CareNotes® are the copyrighted property of A [employer] M [employer]  or [employer]  The above information is an  only  It is not intended as medical advice for individual conditions or treatments  Talk to your doctor, nurse or pharmacist before following any medical regimen to see if it is safe and effective for you

## 2019-03-11 NOTE — PROGRESS NOTES
[facility]        NAME: Jonny Mohr is a 46 y o  female  : 1970    MRN: [id number]  DATE: 2019  TIME: 7:13 PM    Assessment and Plan   Rash [R21]  1  Rash  predniSONE 50 mg tablet   2  Tinea versicolor  fluconazole (DIFLUCAN) 100 mg tablet         Patient Instructions     Medication as prescribed / discussed  Continue to monitor blood pressure and proceed IMMEDIATELY to ED with any concerning symptoms as discussed  Contact PCP for follow-up  Follow up with PCP in 3-5 days  Proceed to  ER if symptoms worsen  Chief Complaint     Chief Complaint   Patient presents with    Rash     Pt c/o red, sporadic, itchy rash allover her arms and left leg for the past few months  Pt has been using hydrocortisone cream and Benadryl for symptoms  Denies fever  History of Present Illness       47-year-old female presenting today with c/o intermittent urticaria over the last few months  No changes in soaps, foods, perfumes, etc  No f/c  No respiratory symptoms  She has been using benadryl and hydrocortisone cream intermittently with some relief  She is also requesting treatment for her chronic intermittent tinea versicolor  No other complaints  Blood pressure 220/132 in office, manually  Patient denies CP, SOB, dizziness, blurred vision, headaches, numbness, tingling, weakness, or confusion  She was on antihypertensives up until 2018 when she no longer was able to get refills on medications as she was not complying with requests from PCP regarding lab work and follow-up appts  No other complaints  Review of Systems   Review of Systems   Constitutional: Negative  Respiratory: Negative  Cardiovascular: Negative  Skin: Positive for rash  Neurological: Negative            Current Medications       Current Outpatient Medications:     ascorbic acid (VITAMIN C) 500 MG tablet, Take 1 tablet (500 mg total) by mouth daily (Patient not taking: Reported on 3/11/2019), Disp: 30 tablet, Rfl: 2    ferrous sulfate 324 (65 Fe) mg, Take 1 tablet (324 mg total) by mouth daily before breakfast (Patient not taking: Reported on 3/11/2019), Disp: 30 tablet, Rfl: 2    fluconazole (DIFLUCAN) 100 mg tablet, Take 1 tablet (100 mg total) by mouth daily for 7 days, Disp: 7 tablet, Rfl: 0    hydrochlorothiazide (HYDRODIURIL) 25 mg tablet, TAKE 1 TABLET (25 MG TOTAL) BY MOUTH DAILY (Patient not taking: Reported on 3/11/2019), Disp: 30 tablet, Rfl: 1    hydrochlorothiazide (HYDRODIURIL) 25 mg tablet, TAKE 1 TABLET (25 MG TOTAL) BY MOUTH DAILY (Patient not taking: Reported on 3/11/2019), Disp: 30 tablet, Rfl: 2    lisinopril (ZESTRIL) 40 mg tablet, Take 1 tablet (40 mg total) by mouth daily (Patient not taking: Reported on 3/11/2019), Disp: 30 tablet, Rfl: 2    metoprolol succinate (TOPROL-XL) 50 mg 24 hr tablet, Take 1 tablet (50 mg total) by mouth daily (Patient not taking: Reported on 3/11/2019), Disp: 30 tablet, Rfl: 0    metoprolol succinate (TOPROL-XL) 50 mg 24 hr tablet, metoprolol succinate ER 50 mg tablet,extended release 24 hr  TAKE 1 TABLET DAILY, Disp: , Rfl:     oseltamivir (TAMIFLU) 75 mg capsule, TAKE 1 CAP(S) 2 TIMES A DAY, Disp: , Rfl: 0    predniSONE 50 mg tablet, Take 1 tablet (50 mg total) by mouth daily for 7 days, Disp: 7 tablet, Rfl: 0    valsartan-hydrochlorothiazide (DIOVAN-HCT) 320-25 MG per tablet, valsartan 320 mg-hydrochlorothiazide 25 mg tablet  TAKE 1 TABLET DAILY  , Disp: , Rfl:     Current Allergies     Allergies as of 03/11/2019 - Reviewed 03/11/2019   Allergen Reaction Noted    Amlodipine Edema 03/11/2019    Other  02/04/2016            The following portions of the patient's history were reviewed and updated as appropriate: allergies, current medications, past family history, past medical history, past social history, past surgical history and problem list      Past Medical History:   Diagnosis Date    Benign neoplasm of parotid gland     left    Hypertension Past Surgical History:   Procedure Laterality Date     SECTION      EAR SURGERY      SALIVARY GLAND SURGERY      TONSILECTOMY AND ADNOIDECTOMY         Family History   Problem Relation Age of Onset    Coronary artery disease Mother         age 52    Coronary artery disease Father         age 63    Breast cancer Sister         age 52    Heart disease Family          Medications have been verified  Objective   BP (!) 220/132 (BP Location: Left arm, Patient Position: Sitting, Cuff Size: Large)   Pulse 94   Temp 97 8 °F (36 6 °C)   Resp 18   Ht 5' 5" (1 651 m)   Wt 81 6 kg (180 lb)   LMP 2019 (Approximate)   SpO2 99%   BMI 29 95 kg/m²        Physical Exam     Physical Exam   Constitutional: She is oriented to person, place, and time  She appears well-developed and well-nourished  Cardiovascular: Normal rate, regular rhythm and normal heart sounds  Pulmonary/Chest: Effort normal and breath sounds normal    Neurological: She is alert and oriented to person, place, and time  Skin: Skin is warm and dry  Hives noted on BUEs and LLE  Tinea versicolor on abdomen  Nursing note and vitals reviewed

## 2019-03-16 ENCOUNTER — TRANSCRIBE ORDERS (OUTPATIENT)
Dept: ADMINISTRATIVE | Facility: HOSPITAL | Age: 49
End: 2019-03-16

## 2019-03-16 ENCOUNTER — LAB (OUTPATIENT)
Dept: LAB | Facility: HOSPITAL | Age: 49
End: 2019-03-16
Payer: COMMERCIAL

## 2019-03-16 DIAGNOSIS — D50.9 MICROCYTIC ANEMIA: ICD-10-CM

## 2019-03-16 DIAGNOSIS — R20.2 PARESTHESIA: ICD-10-CM

## 2019-03-16 LAB
ANISOCYTOSIS BLD QL SMEAR: PRESENT
BASOPHILS # BLD AUTO: 0 THOUSANDS/ΜL (ref 0–0.1)
BASOPHILS NFR BLD AUTO: 0 % (ref 0–1)
EOSINOPHIL # BLD AUTO: 0 THOUSAND/ΜL (ref 0–0.4)
EOSINOPHIL NFR BLD AUTO: 0 % (ref 0–6)
ERYTHROCYTE [DISTWIDTH] IN BLOOD BY AUTOMATED COUNT: 17.2 %
FERRITIN SERPL-MCNC: 4 NG/ML (ref 8–388)
FOLATE SERPL-MCNC: 18.9 NG/ML (ref 3.1–17.5)
HCT VFR BLD AUTO: 36.7 % (ref 36–46)
HGB BLD-MCNC: 11.2 G/DL (ref 12–16)
IRON SATN MFR SERPL: 4 %
IRON SERPL-MCNC: 19 UG/DL (ref 50–170)
LG PLATELETS BLD QL SMEAR: PRESENT
LYMPHOCYTES # BLD AUTO: 1.9 THOUSANDS/ΜL (ref 0.5–4)
LYMPHOCYTES NFR BLD AUTO: 17 % (ref 25–45)
MCH RBC QN AUTO: 23.3 PG (ref 26–34)
MCHC RBC AUTO-ENTMCNC: 30.4 G/DL (ref 31–36)
MCV RBC AUTO: 77 FL (ref 80–100)
MICROCYTES BLD QL AUTO: PRESENT
MONOCYTES # BLD AUTO: 0.6 THOUSAND/ΜL (ref 0.2–0.9)
MONOCYTES NFR BLD AUTO: 5 % (ref 1–10)
NEUTROPHILS # BLD AUTO: 8.9 THOUSANDS/ΜL (ref 1.8–7.8)
NEUTS SEG NFR BLD AUTO: 78 % (ref 45–65)
PLATELET # BLD AUTO: 426 THOUSANDS/UL (ref 150–450)
PLATELET BLD QL SMEAR: ABNORMAL
PMV BLD AUTO: 7.5 FL (ref 8.9–12.7)
RBC # BLD AUTO: 4.78 MILLION/UL (ref 4–5.2)
RBC MORPH BLD: PRESENT
TIBC SERPL-MCNC: 535 UG/DL (ref 250–450)
VIT B12 SERPL-MCNC: 269 PG/ML (ref 100–900)
WBC # BLD AUTO: 11.4 THOUSAND/UL (ref 4.5–11)

## 2019-03-16 PROCEDURE — 36415 COLL VENOUS BLD VENIPUNCTURE: CPT

## 2019-03-16 PROCEDURE — 82746 ASSAY OF FOLIC ACID SERUM: CPT

## 2019-03-16 PROCEDURE — 82607 VITAMIN B-12: CPT

## 2019-03-16 PROCEDURE — 83550 IRON BINDING TEST: CPT

## 2019-03-16 PROCEDURE — 82728 ASSAY OF FERRITIN: CPT

## 2019-03-16 PROCEDURE — 83540 ASSAY OF IRON: CPT

## 2019-03-16 PROCEDURE — 85025 COMPLETE CBC W/AUTO DIFF WBC: CPT

## 2019-03-18 ENCOUNTER — TELEPHONE (OUTPATIENT)
Dept: INTERNAL MEDICINE CLINIC | Facility: CLINIC | Age: 49
End: 2019-03-18

## 2019-03-18 DIAGNOSIS — I10 BENIGN ESSENTIAL HYPERTENSION: ICD-10-CM

## 2019-03-18 RX ORDER — HYDROCHLOROTHIAZIDE 25 MG/1
25 TABLET ORAL DAILY
Qty: 7 TABLET | Refills: 0 | Status: SHIPPED | OUTPATIENT
Start: 2019-03-18 | End: 2019-03-20

## 2019-03-18 RX ORDER — LISINOPRIL 40 MG/1
40 TABLET ORAL DAILY
Qty: 7 TABLET | Refills: 0 | Status: SHIPPED | OUTPATIENT
Start: 2019-03-18 | End: 2019-03-20 | Stop reason: SDUPTHER

## 2019-03-18 RX ORDER — METOPROLOL SUCCINATE 50 MG/1
50 TABLET, EXTENDED RELEASE ORAL DAILY
Qty: 7 TABLET | Refills: 0 | Status: SHIPPED | OUTPATIENT
Start: 2019-03-18 | End: 2019-03-20 | Stop reason: SDUPTHER

## 2019-03-18 NOTE — TELEPHONE ENCOUNTER
Patient is completely out of the following medications Hydrochlorothiazide, lisinopril, metoprolol and uses [facility] in [place]  She has an appointment scheduled for Wed, 3/20 but was asking if we could send over a temporary supply until then

## 2019-03-20 ENCOUNTER — OFFICE VISIT (OUTPATIENT)
Dept: INTERNAL MEDICINE CLINIC | Facility: CLINIC | Age: 49
End: 2019-03-20
Payer: COMMERCIAL

## 2019-03-20 VITALS
OXYGEN SATURATION: 98 % | HEIGHT: 66 IN | WEIGHT: 178.6 LBS | BODY MASS INDEX: 28.7 KG/M2 | SYSTOLIC BLOOD PRESSURE: 124 MMHG | DIASTOLIC BLOOD PRESSURE: 78 MMHG | TEMPERATURE: 98 F | HEART RATE: 59 BPM

## 2019-03-20 DIAGNOSIS — B36.0 TINEA VERSICOLOR: ICD-10-CM

## 2019-03-20 DIAGNOSIS — I10 BENIGN ESSENTIAL HYPERTENSION: ICD-10-CM

## 2019-03-20 DIAGNOSIS — E78.5 DYSLIPIDEMIA (HIGH LDL; LOW HDL): ICD-10-CM

## 2019-03-20 DIAGNOSIS — L50.9 URTICARIA: ICD-10-CM

## 2019-03-20 DIAGNOSIS — I10 HYPERTENSION, UNSPECIFIED TYPE: ICD-10-CM

## 2019-03-20 DIAGNOSIS — Z12.11 SCREENING FOR COLON CANCER: Primary | ICD-10-CM

## 2019-03-20 DIAGNOSIS — D50.9 MICROCYTIC ANEMIA: ICD-10-CM

## 2019-03-20 PROBLEM — Z87.42 HISTORY OF ENDOMETRIOSIS: Status: ACTIVE | Noted: 2017-05-31

## 2019-03-20 PROCEDURE — 3008F BODY MASS INDEX DOCD: CPT | Performed by: INTERNAL MEDICINE

## 2019-03-20 PROCEDURE — 3078F DIAST BP <80 MM HG: CPT | Performed by: INTERNAL MEDICINE

## 2019-03-20 PROCEDURE — 1036F TOBACCO NON-USER: CPT | Performed by: INTERNAL MEDICINE

## 2019-03-20 PROCEDURE — 99214 OFFICE O/P EST MOD 30 MIN: CPT | Performed by: INTERNAL MEDICINE

## 2019-03-20 PROCEDURE — 3074F SYST BP LT 130 MM HG: CPT | Performed by: INTERNAL MEDICINE

## 2019-03-20 RX ORDER — HYDROXYZINE HYDROCHLORIDE 25 MG/1
25 TABLET, FILM COATED ORAL EVERY 6 HOURS PRN
Qty: 30 TABLET | Refills: 0 | Status: SHIPPED | OUTPATIENT
Start: 2019-03-20 | End: 2020-04-14 | Stop reason: SDUPTHER

## 2019-03-20 RX ORDER — LISINOPRIL 40 MG/1
40 TABLET ORAL DAILY
Qty: 90 TABLET | Refills: 1 | Status: SHIPPED | OUTPATIENT
Start: 2019-03-20 | End: 2019-09-18 | Stop reason: SDUPTHER

## 2019-03-20 RX ORDER — METOPROLOL SUCCINATE 50 MG/1
TABLET, EXTENDED RELEASE ORAL
Qty: 90 TABLET | Refills: 1 | Status: SHIPPED | OUTPATIENT
Start: 2019-03-20 | End: 2019-09-18 | Stop reason: SDUPTHER

## 2019-03-20 RX ORDER — HYDROCHLOROTHIAZIDE 25 MG/1
25 TABLET ORAL DAILY
Qty: 90 TABLET | Refills: 1 | Status: SHIPPED | OUTPATIENT
Start: 2019-03-20 | End: 2019-09-18 | Stop reason: SDUPTHER

## 2019-03-20 NOTE — ASSESSMENT & PLAN NOTE
Completed oral steroid therapy  Will prescribe Atarax 25 mg to be taken every 6 hours as needed for pruritus  Will order a [facility] allergy panel

## 2019-03-20 NOTE — ASSESSMENT & PLAN NOTE
Continue with current regimen, controlled while taking metoprolol 50 mg daily, lisinopril 40 mg daily, and hydrochlorothiazide 25 mg daily

## 2019-03-20 NOTE — PROGRESS NOTES
Assessment/Plan:    Benign essential hypertension  Continue with current regimen, controlled while taking metoprolol 50 mg daily, lisinopril 40 mg daily, and hydrochlorothiazide 25 mg daily  Tinea versicolor  Completed Diflucan therapy  Rash resolved  Dyslipidemia (high LDL; low HDL)  Will check a lipid panel  Microcytic anemia  Continue with iron supplementation  Urticaria  Completed oral steroid therapy  Will prescribe Atarax 25 mg to be taken every 6 hours as needed for pruritus  Will order a [facility] allergy panel  Diagnoses and all orders for this visit:    Screening for colon cancer  -     Ambulatory referral to Gastroenterology; Future    Benign essential hypertension  -     lisinopril (ZESTRIL) 40 mg tablet; Take 1 tablet (40 mg total) by mouth daily  -     hydrochlorothiazide (HYDRODIURIL) 25 mg tablet; Take 1 tablet (25 mg total) by mouth daily  -     TSH, 3rd generation with Free T4 reflex; Future  -     Hemoglobin A1C; Future  -     Lipid panel; Future  -     Comprehensive metabolic panel; Future    Hypertension, unspecified type  -     metoprolol succinate (TOPROL-XL) 50 mg 24 hr tablet; Take 1 tablet daily    Tinea versicolor    Dyslipidemia (high LDL; low HDL)  -     TSH, 3rd generation with Free T4 reflex; Future  -     Hemoglobin A1C; Future  -     Lipid panel; Future  -     Comprehensive metabolic panel; Future    Microcytic anemia    Urticaria  -     [facility] Allergy Panel, Adult; Future  -     hydrOXYzine HCL (ATARAX) 25 mg tablet; Take 1 tablet (25 mg total) by mouth every 6 (six) hours as needed for itching, allergies or anxiety          Subjective:      Patient ID: Philipp Wren is a 46 y o  female  58-year-old female is seen today for follow-up of chronic conditions  She has not been compliant with her antihypertensive regimen  She received a refill prior to this appointment and BP is controlled again     She was seen on 03/11/2019 for diffuse urticaria and was prescribed prednisone 50 mg to be taken daily for 7 days  She has noticed improvement in the rash  The rash has been present for the past several months, denies any new detergents or respiratory symptoms  Denies bug bites  She does have an indoor cat but denies seeing any fleas  She denies any new medications  Rash is pruritic and generalized  Lesions start off as wheals and due to scratching become scabs  The following portions of the patient's history were reviewed and updated as appropriate: allergies, current medications, past family history, past medical history, past social history, past surgical history and problem list     Review of Systems   Constitutional: Negative for activity change, appetite change, chills, diaphoresis, fatigue and fever  HENT: Negative for congestion, postnasal drip, rhinorrhea, sinus pressure, sinus pain, sneezing and sore throat  Eyes: Negative for visual disturbance  Respiratory: Negative for apnea, cough, choking, chest tightness, shortness of breath and wheezing  Cardiovascular: Negative for chest pain, palpitations and leg swelling  Gastrointestinal: Negative for abdominal distention, abdominal pain, anal bleeding, blood in stool, constipation, diarrhea, nausea and vomiting  Endocrine: Negative for cold intolerance and heat intolerance  Genitourinary: Negative for difficulty urinating, dysuria and hematuria  Musculoskeletal: Negative  Skin: Negative  Neurological: Negative for dizziness, weakness, light-headedness, numbness and headaches  Hematological: Negative for adenopathy  Psychiatric/Behavioral: Negative for agitation, sleep disturbance and suicidal ideas  All other systems reviewed and are negative          Past Medical History:   Diagnosis Date    Benign neoplasm of parotid gland     left    Hypertension          Current Outpatient Medications:     lisinopril (ZESTRIL) 40 mg tablet, Take 1 tablet (40 mg total) by mouth daily, Disp: 90 tablet, Rfl: 1    metoprolol succinate (TOPROL-XL) 50 mg 24 hr tablet, Take 1 tablet daily, Disp: 90 tablet, Rfl: 1    hydrochlorothiazide (HYDRODIURIL) 25 mg tablet, Take 1 tablet (25 mg total) by mouth daily, Disp: 90 tablet, Rfl: 1    hydrOXYzine HCL (ATARAX) 25 mg tablet, Take 1 tablet (25 mg total) by mouth every 6 (six) hours as needed for itching, allergies or anxiety, Disp: 30 tablet, Rfl: 0    Allergies   Allergen Reactions    Amlodipine Edema    Other      seasonal       Social History   Past Surgical History:   Procedure Laterality Date     SECTION      EAR SURGERY      SALIVARY GLAND SURGERY      TONSILECTOMY AND ADNOIDECTOMY       Family History   Problem Relation Age of Onset    Coronary artery disease Mother         age 52    Coronary artery disease Father         age 61    Breast cancer Sister         age 52    Heart disease Family        Objective:  /78 (BP Location: Left arm, Patient Position: Sitting, Cuff Size: Standard)   Pulse 59   Temp 98 °F (36 7 °C)   Ht 5' 5 5" (1 664 m)   Wt 81 kg (178 lb 9 6 oz)   SpO2 98%   BMI 29 27 kg/m²     Recent Results (from the past 1344 hour(s))   CBC and differential    Collection Time: 19  8:50 AM   Result Value Ref Range    WBC 11 40 (H) 4 50 - 11 00 Thousand/uL    RBC 4 78 4 00 - 5 20 Million/uL    Hemoglobin 11 2 (L) 12 0 - 16 0 g/dL    Hematocrit 36 7 36 0 - 46 0 %    MCV 77 (L) 80 - 100 fL    MCH 23 3 (L) 26 0 - 34 0 pg    MCHC 30 4 (L) 31 0 - 36 0 g/dL    RDW 17 2 (H) <15 3 %    MPV 7 5 (L) 8 9 - 12 7 fL    Platelets 860 157 - 346 Thousands/uL    Neutrophils Relative 78 (H) 45 - 65 %    Lymphocytes Relative 17 (L) 25 - 45 %    Monocytes Relative 5 1 - 10 %    Eosinophils Relative 0 0 - 6 %    Basophils Relative 0 0 - 1 %    Neutrophils Absolute 8 90 (H) 1 80 - 7 80 Thousands/µL    Lymphocytes Absolute 1 90 0 50 - 4 00 Thousands/µL    Monocytes Absolute 0 60 0 20 - 0 90 Thousand/µL    Eosinophils Absolute 0 00 0 00 - 0 40 Thousand/µL    Basophils Absolute 0 00 0 00 - 0 10 Thousands/µL   Vitamin B12    Collection Time: 03/16/19  8:50 AM   Result Value Ref Range    Vitamin B-12 269 100 - 900 pg/mL   Folate    Collection Time: 03/16/19  8:50 AM   Result Value Ref Range    Folate 18 9 (H) 3 1 - 17 5 ng/mL   Iron Saturation %    Collection Time: 03/16/19  8:50 AM   Result Value Ref Range    Iron Saturation 4 %    TIBC 535 (H) 250 - 450 ug/dL    Iron 19 (L) 50 - 170 ug/dL   Ferritin    Collection Time: 03/16/19  8:50 AM   Result Value Ref Range    Ferritin 4 (L) 8 - 388 ng/mL   Smear Review(Phlebs Do Not Order)    Collection Time: 03/16/19  8:50 AM   Result Value Ref Range    RBC Morphology Present     Anisocytosis Present     Microcytes Present     Platelet Estimate Increased (A) Adequate    Large Platelet Present             Physical Exam   Constitutional: She is oriented to person, place, and time  She appears well-developed and well-nourished  No distress  HENT:   Head: Normocephalic and atraumatic  Eyes: Pupils are equal, round, and reactive to light  Conjunctivae and EOM are normal  Right eye exhibits no discharge  Left eye exhibits no discharge  Neck: Normal range of motion  Neck supple  No JVD present  No thyromegaly present  Cardiovascular: Normal rate, regular rhythm, normal heart sounds and intact distal pulses  Exam reveals no gallop and no friction rub  No murmur heard  Pulmonary/Chest: Effort normal and breath sounds normal  No respiratory distress  She has no wheezes  She has no rales  She exhibits no tenderness  Abdominal: Soft  She exhibits no distension  There is no tenderness  Musculoskeletal: Normal range of motion  She exhibits no edema, tenderness or deformity  Lymphadenopathy:     She has no cervical adenopathy  Neurological: She is alert and oriented to person, place, and time  No cranial nerve deficit  Coordination normal    Skin: Skin is warm and dry  No rash noted   She is not diaphoretic  No erythema  No pallor  Generalized scabs throughout upper and lower extremities  1 1 wheal localized to right wrist, and 2 on her back  Psychiatric: She has a normal mood and affect  Her behavior is normal  Judgment and thought content normal    Nursing note and vitals reviewed

## 2019-03-22 NOTE — RESULT ENCOUNTER NOTE
I reviewed chart and saw that you saw this patient for follow up, I wanted to make sure you had all of her recent labs    Thanks Devon Dick

## 2019-03-23 LAB — HBA1C MFR BLD HPLC: 5.8 %

## 2019-04-03 ENCOUNTER — TELEPHONE (OUTPATIENT)
Dept: INTERNAL MEDICINE CLINIC | Facility: CLINIC | Age: 49
End: 2019-04-03

## 2019-04-03 DIAGNOSIS — E78.5 DYSLIPIDEMIA (HIGH LDL; LOW HDL): Primary | ICD-10-CM

## 2019-04-03 RX ORDER — OMEGA-3-ACID ETHYL ESTERS 1 G/1
2 CAPSULE, LIQUID FILLED ORAL 2 TIMES DAILY
Qty: 120 CAPSULE | Refills: 6 | Status: SHIPPED | OUTPATIENT
Start: 2019-04-03 | End: 2019-09-18 | Stop reason: SDUPTHER

## 2019-07-24 DIAGNOSIS — I10 BENIGN ESSENTIAL HYPERTENSION: ICD-10-CM

## 2019-07-24 DIAGNOSIS — I10 HYPERTENSION, UNSPECIFIED TYPE: ICD-10-CM

## 2019-07-24 RX ORDER — METOPROLOL SUCCINATE 50 MG/1
TABLET, EXTENDED RELEASE ORAL
Qty: 90 TABLET | Refills: 1 | OUTPATIENT
Start: 2019-07-24

## 2019-07-24 RX ORDER — HYDROCHLOROTHIAZIDE 25 MG/1
TABLET ORAL
Qty: 90 TABLET | Refills: 1 | OUTPATIENT
Start: 2019-07-24

## 2019-08-09 DIAGNOSIS — I10 HYPERTENSION, UNSPECIFIED TYPE: ICD-10-CM

## 2019-08-09 DIAGNOSIS — I10 BENIGN ESSENTIAL HYPERTENSION: ICD-10-CM

## 2019-08-09 RX ORDER — HYDROCHLOROTHIAZIDE 25 MG/1
TABLET ORAL
Qty: 90 TABLET | Refills: 1 | OUTPATIENT
Start: 2019-08-09

## 2019-08-09 RX ORDER — METOPROLOL SUCCINATE 50 MG/1
TABLET, EXTENDED RELEASE ORAL
Qty: 90 TABLET | Refills: 1 | OUTPATIENT
Start: 2019-08-09

## 2019-09-06 DIAGNOSIS — I10 HYPERTENSION, UNSPECIFIED TYPE: ICD-10-CM

## 2019-09-06 DIAGNOSIS — I10 BENIGN ESSENTIAL HYPERTENSION: ICD-10-CM

## 2019-09-09 RX ORDER — METOPROLOL SUCCINATE 50 MG/1
TABLET, EXTENDED RELEASE ORAL
Qty: 90 TABLET | Refills: 1 | OUTPATIENT
Start: 2019-09-09

## 2019-09-09 RX ORDER — HYDROCHLOROTHIAZIDE 25 MG/1
TABLET ORAL
Qty: 90 TABLET | Refills: 1 | OUTPATIENT
Start: 2019-09-09

## 2019-09-18 ENCOUNTER — TELEPHONE (OUTPATIENT)
Dept: INTERNAL MEDICINE CLINIC | Facility: CLINIC | Age: 49
End: 2019-09-18

## 2019-09-18 ENCOUNTER — OFFICE VISIT (OUTPATIENT)
Dept: INTERNAL MEDICINE CLINIC | Facility: CLINIC | Age: 49
End: 2019-09-18
Payer: COMMERCIAL

## 2019-09-18 VITALS
SYSTOLIC BLOOD PRESSURE: 140 MMHG | BODY MASS INDEX: 29.44 KG/M2 | HEART RATE: 70 BPM | TEMPERATURE: 98.3 F | WEIGHT: 183.2 LBS | HEIGHT: 66 IN | DIASTOLIC BLOOD PRESSURE: 96 MMHG | OXYGEN SATURATION: 98 %

## 2019-09-18 DIAGNOSIS — E78.5 DYSLIPIDEMIA (HIGH LDL; LOW HDL): ICD-10-CM

## 2019-09-18 DIAGNOSIS — I10 HYPERTENSION, UNSPECIFIED TYPE: ICD-10-CM

## 2019-09-18 DIAGNOSIS — Z12.11 ENCOUNTER FOR SCREENING FOR MALIGNANT NEOPLASM OF COLON: ICD-10-CM

## 2019-09-18 DIAGNOSIS — I10 BENIGN ESSENTIAL HYPERTENSION: ICD-10-CM

## 2019-09-18 DIAGNOSIS — Z12.31 ENCOUNTER FOR SCREENING MAMMOGRAM FOR MALIGNANT NEOPLASM OF BREAST: Primary | ICD-10-CM

## 2019-09-18 DIAGNOSIS — Z12.39 SCREENING FOR BREAST CANCER: ICD-10-CM

## 2019-09-18 DIAGNOSIS — B36.0 TINEA VERSICOLOR: ICD-10-CM

## 2019-09-18 DIAGNOSIS — K21.9 GASTROESOPHAGEAL REFLUX DISEASE WITHOUT ESOPHAGITIS: ICD-10-CM

## 2019-09-18 DIAGNOSIS — Z12.4 CERVICAL CANCER SCREENING: ICD-10-CM

## 2019-09-18 PROCEDURE — 3008F BODY MASS INDEX DOCD: CPT | Performed by: INTERNAL MEDICINE

## 2019-09-18 PROCEDURE — 99214 OFFICE O/P EST MOD 30 MIN: CPT | Performed by: INTERNAL MEDICINE

## 2019-09-18 RX ORDER — FLUCONAZOLE 100 MG/1
100 TABLET ORAL DAILY
Qty: 7 TABLET | Refills: 1 | Status: SHIPPED | OUTPATIENT
Start: 2019-09-18 | End: 2019-09-25

## 2019-09-18 RX ORDER — LISINOPRIL 40 MG/1
40 TABLET ORAL DAILY
Qty: 90 TABLET | Refills: 1 | Status: SHIPPED | OUTPATIENT
Start: 2019-09-18 | End: 2020-04-09 | Stop reason: SDUPTHER

## 2019-09-18 RX ORDER — RANITIDINE 150 MG/1
TABLET ORAL
Qty: 60 TABLET | Refills: 5 | Status: SHIPPED | OUTPATIENT
Start: 2019-09-18 | End: 2020-08-04

## 2019-09-18 RX ORDER — OMEGA-3-ACID ETHYL ESTERS 1 G/1
2 CAPSULE, LIQUID FILLED ORAL 2 TIMES DAILY
Qty: 360 CAPSULE | Refills: 1 | Status: SHIPPED | OUTPATIENT
Start: 2019-09-18 | End: 2020-04-14 | Stop reason: SDUPTHER

## 2019-09-18 RX ORDER — HYDROCHLOROTHIAZIDE 25 MG/1
25 TABLET ORAL DAILY
Qty: 90 TABLET | Refills: 1 | Status: SHIPPED | OUTPATIENT
Start: 2019-09-18 | End: 2020-04-09 | Stop reason: SDUPTHER

## 2019-09-18 RX ORDER — METOPROLOL SUCCINATE 50 MG/1
TABLET, EXTENDED RELEASE ORAL
Qty: 90 TABLET | Refills: 1 | Status: SHIPPED | OUTPATIENT
Start: 2019-09-18 | End: 2020-04-09 | Stop reason: SDUPTHER

## 2019-09-18 NOTE — PROGRESS NOTES
Assessment/Plan:    Gastroesophageal reflux disease without esophagitis  Will start Zantac 150 mg daily-b i d  Benign essential hypertension  Elevated this morning however likely due to caffeine intake prior to appointment  Continue with hydrochlorothiazide 25 mg daily, lisinopril 40 mg daily, metoprolol 50 mg daily  Tinea versicolor  Will prescribe Diflucan 100 mg daily for 7 days  Diagnoses and all orders for this visit:    Encounter for screening mammogram for malignant neoplasm of breast    Screening for breast cancer    Benign essential hypertension  -     hydrochlorothiazide (HYDRODIURIL) 25 mg tablet; Take 1 tablet (25 mg total) by mouth daily  -     lisinopril (ZESTRIL) 40 mg tablet; Take 1 tablet (40 mg total) by mouth daily    Dyslipidemia (high LDL; low HDL)  -     omega-3-acid ethyl esters (LOVAZA) 1 g capsule; Take 2 capsules (2 g total) by mouth 2 (two) times a day  -     CBC; Future  -     Comprehensive metabolic panel; Future  -     Lipid panel; Future    Hypertension, unspecified type  -     metoprolol succinate (TOPROL-XL) 50 mg 24 hr tablet; Take 1 tablet daily  -     CBC; Future  -     Comprehensive metabolic panel; Future  -     Lipid panel; Future  -     TSH, 3rd generation with Free T4 reflex; Future    Encounter for screening for malignant neoplasm of colon  -     Cologuard; Future    BMI 30 0-30 9,adult    Tinea versicolor  -     fluconazole (DIFLUCAN) 100 mg tablet; Take 1 tablet (100 mg total) by mouth daily for 7 days    Gastroesophageal reflux disease without esophagitis  -     ranitidine (ZANTAC) 150 mg tablet; Take 1 tablet daily 30 minutes before breakfast   If symptoms do not improve, take 1 tablet twice a day  Cervical cancer screening  -     Ambulatory referral to Gynecology; Future    Other orders  -     Cancel: Mammo screening bilateral w 3d & cad; Future  -     Cancel: Mammo screening bilateral w cad; Future          BMI Counseling:  Body mass index is 30 02 kg/m²  Discussed the patient's BMI with her  The BMI is above normal  Nutrition recommendations include reducing portion sizes, decreasing overall calorie intake, 3-5 servings of fruits/vegetables daily, reducing fast food intake, consuming healthier snacks, decreasing soda and/or juice intake, moderation in carbohydrate intake, increasing intake of lean protein, reducing intake of saturated fat and trans fat and reducing intake of cholesterol  Exercise recommendations include moderate aerobic physical activity for 150 minutes/week and exercising 3-5 times per week  Subjective:      Patient ID: Mark Nuñez is a 46 y o  female  38-year-old female is seen today for follow-up of chronic conditions  No laboratory studies to review today  Laboratory studies from 03/2019 reviewed  She has recurrence of tinea versicolor on chest  Diflucan is the only medication treatment that has been shown effective  BP slightly elevated this morning however she does admit to drinking coffee prior to this appointment  She has been experiencing acid reflux symptoms since 1 week that have not improved with OTC antacids  She gets acid reflux periodically  She tried omeprazole, which helped as she was told to only take 2 weeks  Otherwise, no other active complaints or concerns at this time  Hypertension   This is a chronic problem  The current episode started more than 1 year ago  The problem is unchanged  The problem is controlled  Pertinent negatives include no anxiety, blurred vision, chest pain, headaches, malaise/fatigue, neck pain, orthopnea, palpitations, peripheral edema, PND, shortness of breath or sweats  Past treatments include ACE inhibitors, beta blockers, calcium channel blockers and diuretics  The current treatment provides moderate improvement  Compliance problems include exercise and diet  Heartburn   She complains of heartburn   She reports no abdominal pain, no chest pain, no choking, no coughing, no nausea, no sore throat or no wheezing  This is a chronic problem  The current episode started more than 1 year ago  The heartburn is located in the substernum  The heartburn is of moderate intensity  The symptoms are aggravated by certain foods  Pertinent negatives include no fatigue  She has tried a PPI and an antacid for the symptoms  The treatment provided moderate relief  The following portions of the patient's history were reviewed and updated as appropriate: allergies, current medications, past family history, past medical history, past social history, past surgical history and problem list     Review of Systems   Constitutional: Negative for activity change, appetite change, chills, diaphoresis, fatigue, fever and malaise/fatigue  HENT: Negative for congestion, postnasal drip, rhinorrhea, sinus pressure, sinus pain, sneezing and sore throat  Eyes: Negative for blurred vision and visual disturbance  Respiratory: Negative for apnea, cough, choking, chest tightness, shortness of breath and wheezing  Cardiovascular: Negative for chest pain, palpitations, orthopnea, leg swelling and PND  Gastrointestinal: Positive for heartburn  Negative for abdominal distention, abdominal pain, anal bleeding, blood in stool, constipation, diarrhea, nausea and vomiting  Endocrine: Negative for cold intolerance and heat intolerance  Genitourinary: Negative for difficulty urinating, dysuria and hematuria  Musculoskeletal: Negative  Negative for neck pain  Skin: Negative  Neurological: Negative for dizziness, weakness, light-headedness, numbness and headaches  Hematological: Negative for adenopathy  Psychiatric/Behavioral: Negative for agitation, sleep disturbance and suicidal ideas  All other systems reviewed and are negative          Past Medical History:   Diagnosis Date    Benign neoplasm of parotid gland     left    Hypertension          Current Outpatient Medications:    hydrochlorothiazide (HYDRODIURIL) 25 mg tablet, Take 1 tablet (25 mg total) by mouth daily, Disp: 90 tablet, Rfl: 1    hydrOXYzine HCL (ATARAX) 25 mg tablet, Take 1 tablet (25 mg total) by mouth every 6 (six) hours as needed for itching, allergies or anxiety, Disp: 30 tablet, Rfl: 0    lisinopril (ZESTRIL) 40 mg tablet, Take 1 tablet (40 mg total) by mouth daily, Disp: 90 tablet, Rfl: 1    metoprolol succinate (TOPROL-XL) 50 mg 24 hr tablet, Take 1 tablet daily, Disp: 90 tablet, Rfl: 1    omega-3-acid ethyl esters (LOVAZA) 1 g capsule, Take 2 capsules (2 g total) by mouth 2 (two) times a day, Disp: 360 capsule, Rfl: 1    fluconazole (DIFLUCAN) 100 mg tablet, Take 1 tablet (100 mg total) by mouth daily for 7 days, Disp: 7 tablet, Rfl: 1    ranitidine (ZANTAC) 150 mg tablet, Take 1 tablet daily 30 minutes before breakfast   If symptoms do not improve, take 1 tablet twice a day , Disp: 60 tablet, Rfl: 5    Allergies   Allergen Reactions    Amlodipine Edema    Other      seasonal       Social History   Past Surgical History:   Procedure Laterality Date     SECTION      EAR SURGERY      SALIVARY GLAND SURGERY      TONSILECTOMY AND ADNOIDECTOMY       Family History   Problem Relation Age of Onset    Coronary artery disease Mother         age 52    Coronary artery disease Father         age 61    Breast cancer Sister         age 52       Objective:  /96 (BP Location: Left arm, Patient Position: Sitting, Cuff Size: Adult)   Pulse 70   Temp 98 3 °F (36 8 °C) (Oral)   Ht 5' 5 5" (1 664 m)   Wt 83 1 kg (183 lb 3 2 oz) Comment: with shoes  SpO2 98% Comment: ra  BMI 30 02 kg/m²     No results found for this or any previous visit (from the past 1344 hour(s))  Physical Exam   Constitutional: She is oriented to person, place, and time  She appears well-developed and well-nourished  No distress  HENT:   Head: Normocephalic and atraumatic     Eyes: Pupils are equal, round, and reactive to light  Conjunctivae and EOM are normal  Right eye exhibits no discharge  Left eye exhibits no discharge  Neck: Normal range of motion  Neck supple  No JVD present  No thyromegaly present  Cardiovascular: Normal rate, regular rhythm, normal heart sounds and intact distal pulses  Exam reveals no gallop and no friction rub  No murmur heard  Pulmonary/Chest: Effort normal and breath sounds normal  No respiratory distress  She has no wheezes  She has no rales  She exhibits no tenderness  Abdominal: Soft  She exhibits no distension  There is no tenderness  Musculoskeletal: Normal range of motion  She exhibits no edema, tenderness or deformity  Lymphadenopathy:     She has no cervical adenopathy  Neurological: She is alert and oriented to person, place, and time  No cranial nerve deficit  Coordination normal    Skin: Skin is warm and dry  No rash noted  She is not diaphoretic  No erythema  No pallor  Psychiatric: She has a normal mood and affect  Her behavior is normal  Judgment and thought content normal    Nursing note and vitals reviewed

## 2019-09-18 NOTE — ASSESSMENT & PLAN NOTE
Elevated this morning however likely due to caffeine intake prior to appointment  Continue with hydrochlorothiazide 25 mg daily, lisinopril 40 mg daily, metoprolol 50 mg daily

## 2020-01-13 ENCOUNTER — TELEPHONE (OUTPATIENT)
Dept: INTERNAL MEDICINE CLINIC | Facility: CLINIC | Age: 50
End: 2020-01-13

## 2020-01-13 NOTE — TELEPHONE ENCOUNTER
LM to call back to schedule and appt and to get the Cologuard done  If any questions call back the office

## 2020-02-26 ENCOUNTER — TELEPHONE (OUTPATIENT)
Dept: INTERNAL MEDICINE CLINIC | Facility: CLINIC | Age: 50
End: 2020-02-26

## 2020-02-26 DIAGNOSIS — Z12.39 SCREENING FOR BREAST CANCER: Primary | ICD-10-CM

## 2020-02-26 NOTE — TELEPHONE ENCOUNTER
LMOM  Reminder call she is due for a mammo  Also asked her to call back so we can make a follow up appt

## 2020-04-09 DIAGNOSIS — L50.9 URTICARIA: ICD-10-CM

## 2020-04-09 DIAGNOSIS — I10 HYPERTENSION, UNSPECIFIED TYPE: ICD-10-CM

## 2020-04-09 DIAGNOSIS — I10 BENIGN ESSENTIAL HYPERTENSION: ICD-10-CM

## 2020-04-09 RX ORDER — METOPROLOL SUCCINATE 50 MG/1
TABLET, EXTENDED RELEASE ORAL
Qty: 90 TABLET | Refills: 1 | Status: SHIPPED | OUTPATIENT
Start: 2020-04-09 | End: 2020-08-04 | Stop reason: SDUPTHER

## 2020-04-09 RX ORDER — HYDROCHLOROTHIAZIDE 25 MG/1
25 TABLET ORAL DAILY
Qty: 90 TABLET | Refills: 1 | Status: SHIPPED | OUTPATIENT
Start: 2020-04-09 | End: 2020-08-04 | Stop reason: SDUPTHER

## 2020-04-09 RX ORDER — LISINOPRIL 40 MG/1
40 TABLET ORAL DAILY
Qty: 90 TABLET | Refills: 1 | Status: SHIPPED | OUTPATIENT
Start: 2020-04-09 | End: 2020-08-04 | Stop reason: SDUPTHER

## 2020-04-11 DIAGNOSIS — K21.9 GASTROESOPHAGEAL REFLUX DISEASE WITHOUT ESOPHAGITIS: ICD-10-CM

## 2020-04-13 RX ORDER — RANITIDINE 150 MG/1
TABLET ORAL
Qty: 180 TABLET | Refills: 1 | OUTPATIENT
Start: 2020-04-13

## 2020-04-14 ENCOUNTER — TELEMEDICINE (OUTPATIENT)
Dept: INTERNAL MEDICINE CLINIC | Facility: CLINIC | Age: 50
End: 2020-04-14
Payer: COMMERCIAL

## 2020-04-14 DIAGNOSIS — K21.9 GASTROESOPHAGEAL REFLUX DISEASE WITHOUT ESOPHAGITIS: Primary | ICD-10-CM

## 2020-04-14 DIAGNOSIS — I10 BENIGN ESSENTIAL HYPERTENSION: ICD-10-CM

## 2020-04-14 DIAGNOSIS — E78.5 DYSLIPIDEMIA (HIGH LDL; LOW HDL): ICD-10-CM

## 2020-04-14 DIAGNOSIS — L50.9 URTICARIA: ICD-10-CM

## 2020-04-14 PROBLEM — L20.82 FLEXURAL ECZEMA: Status: ACTIVE | Noted: 2020-04-14

## 2020-04-14 PROBLEM — L20.82 FLEXURAL ECZEMA: Status: RESOLVED | Noted: 2020-04-14 | Resolved: 2020-04-14

## 2020-04-14 PROCEDURE — 99442 PR PHYS/QHP TELEPHONE EVALUATION 11-20 MIN: CPT | Performed by: INTERNAL MEDICINE

## 2020-04-14 RX ORDER — OMEGA-3-ACID ETHYL ESTERS 1 G/1
2 CAPSULE, LIQUID FILLED ORAL 2 TIMES DAILY
Qty: 360 CAPSULE | Refills: 1 | Status: SHIPPED | OUTPATIENT
Start: 2020-04-14 | End: 2020-08-04

## 2020-04-14 RX ORDER — HYDROXYZINE HYDROCHLORIDE 25 MG/1
25 TABLET, FILM COATED ORAL EVERY 6 HOURS PRN
Qty: 30 TABLET | Refills: 0 | Status: SHIPPED | OUTPATIENT
Start: 2020-04-14 | End: 2020-08-04

## 2020-07-25 LAB — HBA1C MFR BLD HPLC: 5.7 %

## 2020-08-04 ENCOUNTER — OFFICE VISIT (OUTPATIENT)
Dept: INTERNAL MEDICINE CLINIC | Facility: CLINIC | Age: 50
End: 2020-08-04
Payer: COMMERCIAL

## 2020-08-04 VITALS
BODY MASS INDEX: 29.02 KG/M2 | SYSTOLIC BLOOD PRESSURE: 116 MMHG | HEIGHT: 66 IN | HEART RATE: 56 BPM | TEMPERATURE: 97.7 F | OXYGEN SATURATION: 98 % | WEIGHT: 180.6 LBS | DIASTOLIC BLOOD PRESSURE: 84 MMHG

## 2020-08-04 DIAGNOSIS — R79.89 ABNORMAL THYROID BLOOD TEST: Primary | ICD-10-CM

## 2020-08-04 DIAGNOSIS — I10 BENIGN ESSENTIAL HYPERTENSION: ICD-10-CM

## 2020-08-04 DIAGNOSIS — K21.9 GASTROESOPHAGEAL REFLUX DISEASE WITHOUT ESOPHAGITIS: ICD-10-CM

## 2020-08-04 DIAGNOSIS — I10 HYPERTENSION, UNSPECIFIED TYPE: ICD-10-CM

## 2020-08-04 DIAGNOSIS — E78.5 DYSLIPIDEMIA (HIGH LDL; LOW HDL): ICD-10-CM

## 2020-08-04 PROBLEM — L50.9 URTICARIA: Status: RESOLVED | Noted: 2019-03-20 | Resolved: 2020-08-04

## 2020-08-04 PROCEDURE — 1036F TOBACCO NON-USER: CPT | Performed by: INTERNAL MEDICINE

## 2020-08-04 PROCEDURE — 3008F BODY MASS INDEX DOCD: CPT | Performed by: INTERNAL MEDICINE

## 2020-08-04 PROCEDURE — 99214 OFFICE O/P EST MOD 30 MIN: CPT | Performed by: INTERNAL MEDICINE

## 2020-08-04 PROCEDURE — 3074F SYST BP LT 130 MM HG: CPT | Performed by: INTERNAL MEDICINE

## 2020-08-04 PROCEDURE — 3725F SCREEN DEPRESSION PERFORMED: CPT | Performed by: INTERNAL MEDICINE

## 2020-08-04 PROCEDURE — 3079F DIAST BP 80-89 MM HG: CPT | Performed by: INTERNAL MEDICINE

## 2020-08-04 RX ORDER — FENOFIBRATE 54 MG/1
54 TABLET ORAL DAILY
Qty: 90 TABLET | Refills: 1 | Status: SHIPPED | OUTPATIENT
Start: 2020-08-04 | End: 2021-01-07

## 2020-08-04 RX ORDER — HYDROCHLOROTHIAZIDE 25 MG/1
25 TABLET ORAL DAILY
Qty: 90 TABLET | Refills: 1 | Status: SHIPPED | OUTPATIENT
Start: 2020-08-04 | End: 2021-01-07 | Stop reason: SDUPTHER

## 2020-08-04 RX ORDER — LISINOPRIL 40 MG/1
40 TABLET ORAL DAILY
Qty: 90 TABLET | Refills: 1 | Status: SHIPPED | OUTPATIENT
Start: 2020-08-04 | End: 2021-01-07 | Stop reason: SDUPTHER

## 2020-08-04 RX ORDER — METOPROLOL SUCCINATE 50 MG/1
TABLET, EXTENDED RELEASE ORAL
Qty: 90 TABLET | Refills: 1 | Status: SHIPPED | OUTPATIENT
Start: 2020-08-04 | End: 2021-01-07 | Stop reason: SDUPTHER

## 2020-08-04 NOTE — PROGRESS NOTES
Assessment/Plan:    Gastroesophageal reflux disease without esophagitis  Controlled  Continue over-the-counter antacids  Benign essential hypertension  Controlled  Continue hydrochlorothiazide 25 mg daily, lisinopril 40 mg daily, and metoprolol succinate 50 mg daily  Dyslipidemia (high LDL; low HDL)  Will start Tricor 54 mg daily  Recheck lipid panel in 8 months  Abnormal thyroid blood test  Will check a thyroid ultrasound for further evaluation  Recheck TSH with reflex free T4 in 3 months  Diagnoses and all orders for this visit:    Abnormal thyroid blood test  -     US thyroid; Future  -     TSH, 3rd generation with Free T4 reflex; Future    BMI 30 0-30 9,adult    Dyslipidemia (high LDL; low HDL)  -     fenofibrate (TRICOR) 54 MG tablet; Take 1 tablet (54 mg total) by mouth daily    Benign essential hypertension  -     hydrochlorothiazide (HYDRODIURIL) 25 mg tablet; Take 1 tablet (25 mg total) by mouth daily  -     lisinopril (ZESTRIL) 40 mg tablet; Take 1 tablet (40 mg total) by mouth daily    Gastroesophageal reflux disease without esophagitis    Hypertension, unspecified type  -     metoprolol succinate (TOPROL-XL) 50 mg 24 hr tablet; Take 1 tablet daily          BMI Counseling: Body mass index is 29 6 kg/m²  The BMI is above normal  Nutrition recommendations include decreasing portion sizes, encouraging healthy choices of fruits and vegetables, decreasing fast food intake, consuming healthier snacks, limiting drinks that contain sugar, moderation in carbohydrate intake, increasing intake of lean protein, reducing intake of saturated and trans fat and reducing intake of cholesterol  Exercise recommendations include moderate physical activity 150 minutes/week and exercising 3-5 times per week  No pharmacotherapy was ordered  Patient referred to PCP due to patient being overweight              Time spent during encounter: 25 minutes (counseling, reviewing medications, and discussing treatment and plan)    Subjective:      Patient ID: Ambreen Valenzuela is a 44 y o  female  Chief Complaint   Patient presents with    Follow-up     Patient is  here for f/u HTN and review blood work  Pt does not have any new medical concerns  28-year-old female is seen today for follow-up of chronic conditions  Laboratory studies reviewed today, lipid panel shows a total cholesterol 231, triglycerides are 381, LDL of 132 and HDL of 40  She was on fish oil supplementation approximately 1 g daily  CBC and CMP are within acceptable range  She underwent thyroid testing to which TSH was elevated at 6 14, thyroglobulin antibodies was elevated at 13, thyroid peroxidase antibodies were elevated at 217, free T4 is at 0 9, and free T3 at 2 5  Vitamin-D level was appropriate at 37  She also went hormone testing as she has been experiencing decreased frequency in menstruation  She follows up with her gynecologist regularly, who ordered the hormone studies  Other than fatigue, which she claims that she has always felt, is her only potential symptoms of hypothyroidism  Hypertension   This is a chronic problem  The current episode started more than 1 year ago  The problem is unchanged  The problem is controlled  Pertinent negatives include no chest pain, headaches, palpitations or shortness of breath  Risk factors for coronary artery disease include dyslipidemia and obesity  Past treatments include ACE inhibitors, beta blockers and diuretics  The current treatment provides moderate improvement  There are no compliance problems  Identifiable causes of hypertension include a thyroid problem  Hyperlipidemia   This is a chronic problem  The current episode started more than 1 year ago  The problem is uncontrolled  Recent lipid tests were reviewed and are normal  There are no known factors aggravating her hyperlipidemia  Pertinent negatives include no chest pain or shortness of breath   She is currently on no antihyperlipidemic treatment  The current treatment provides moderate improvement of lipids  There are no compliance problems  Thyroid Problem   Presents for follow-up visit  Symptoms include fatigue and menstrual problem  Patient reports no cold intolerance, constipation, diaphoresis, diarrhea, heat intolerance or palpitations  Her past medical history is significant for hyperlipidemia  The following portions of the patient's history were reviewed and updated as appropriate: allergies, current medications, past family history, past medical history, past social history, past surgical history and problem list     Review of Systems   Constitutional: Positive for fatigue  Negative for activity change, appetite change, chills, diaphoresis and fever  HENT: Negative for congestion, postnasal drip, rhinorrhea, sinus pressure, sinus pain, sneezing and sore throat  Eyes: Negative for visual disturbance  Respiratory: Negative for apnea, cough, choking, chest tightness, shortness of breath and wheezing  Cardiovascular: Negative for chest pain, palpitations and leg swelling  Gastrointestinal: Negative for abdominal distention, abdominal pain, anal bleeding, blood in stool, constipation, diarrhea, nausea and vomiting  Endocrine: Negative for cold intolerance and heat intolerance  Genitourinary: Positive for menstrual problem  Negative for difficulty urinating, dysuria and hematuria  Musculoskeletal: Negative  Skin: Negative  Neurological: Negative for dizziness, weakness, light-headedness, numbness and headaches  Hematological: Negative for adenopathy  Psychiatric/Behavioral: Negative for agitation, sleep disturbance and suicidal ideas  All other systems reviewed and are negative          Past Medical History:   Diagnosis Date    Benign neoplasm of parotid gland     left    Flexural eczema 4/14/2020    Hypertension     Urticaria 3/20/2019         Current Outpatient Medications:     hydrochlorothiazide (HYDRODIURIL) 25 mg tablet, Take 1 tablet (25 mg total) by mouth daily, Disp: 90 tablet, Rfl: 1    lisinopril (ZESTRIL) 40 mg tablet, Take 1 tablet (40 mg total) by mouth daily, Disp: 90 tablet, Rfl: 1    metoprolol succinate (TOPROL-XL) 50 mg 24 hr tablet, Take 1 tablet daily, Disp: 90 tablet, Rfl: 1    fenofibrate (TRICOR) 54 MG tablet, Take 1 tablet (54 mg total) by mouth daily, Disp: 90 tablet, Rfl: 1    Allergies   Allergen Reactions    Amlodipine Edema    Other      seasonal       Social History   Past Surgical History:   Procedure Laterality Date     SECTION      EAR SURGERY      SALIVARY GLAND SURGERY      TONSILECTOMY AND ADNOIDECTOMY       Family History   Problem Relation Age of Onset    Coronary artery disease Mother         age 52    Coronary artery disease Father         age 61    Breast cancer Sister         age 52       Objective:  /84 (BP Location: Left arm, Patient Position: Sitting, Cuff Size: Standard)   Pulse 56   Temp 97 7 °F (36 5 °C) (Temporal)   Ht 5' 5 5"   Wt 81 9 kg (180 lb 9 6 oz)   SpO2 98%   BMI 29 60 kg/m²     No results found for this or any previous visit (from the past 1344 hour(s))  Physical Exam   Constitutional: She is oriented to person, place, and time  She appears well-developed  No distress  HENT:   Head: Normocephalic and atraumatic  Eyes: Pupils are equal, round, and reactive to light  Conjunctivae are normal  Right eye exhibits no discharge  Left eye exhibits no discharge  Neck: Normal range of motion  Neck supple  No JVD present  No thyromegaly present  Cardiovascular: Normal rate, regular rhythm and normal heart sounds  Exam reveals no gallop and no friction rub  No murmur heard  Pulmonary/Chest: Effort normal and breath sounds normal  No respiratory distress  She has no wheezes  She has no rales  She exhibits no tenderness  Abdominal: Soft  She exhibits no distension  There is no abdominal tenderness  Musculoskeletal: Normal range of motion  General: No tenderness or deformity  Lymphadenopathy:     She has no cervical adenopathy  Neurological: She is alert and oriented to person, place, and time  No cranial nerve deficit  Coordination normal    Skin: Skin is warm and dry  No rash noted  She is not diaphoretic  No erythema  No pallor  Psychiatric: Her behavior is normal  Judgment and thought content normal    Nursing note and vitals reviewed

## 2020-08-04 NOTE — ASSESSMENT & PLAN NOTE
Will check a thyroid ultrasound for further evaluation  Recheck TSH with reflex free T4 in 3 months

## 2020-08-04 NOTE — ASSESSMENT & PLAN NOTE
Controlled  Continue hydrochlorothiazide 25 mg daily, lisinopril 40 mg daily, and metoprolol succinate 50 mg daily

## 2020-08-05 ENCOUNTER — HOSPITAL ENCOUNTER (OUTPATIENT)
Dept: RADIOLOGY | Facility: IMAGING CENTER | Age: 50
Discharge: HOME | End: 2020-08-05
Payer: COMMERCIAL

## 2020-08-05 DIAGNOSIS — R79.89 ABNORMAL THYROID BLOOD TEST: ICD-10-CM

## 2020-08-05 PROCEDURE — 76536 US EXAM OF HEAD AND NECK: CPT

## 2020-08-10 ENCOUNTER — TELEPHONE (OUTPATIENT)
Dept: INTERNAL MEDICINE CLINIC | Facility: CLINIC | Age: 50
End: 2020-08-10

## 2020-08-10 ENCOUNTER — TELEPHONE (OUTPATIENT)
Dept: INTERNAL MEDICINE CLINIC | Age: 50
End: 2020-08-10

## 2020-08-10 NOTE — TELEPHONE ENCOUNTER
----- Message from Nikki Jay sent at 8/10/2020  1:19 PM EDT -----  Regarding: Test Results Question  Contact: [phone]  I'm just checking if you received anything on the thyroid ultrasound  They said it would be a few days   I had it done last Wednesday am      Rosalino Sage

## 2020-09-26 ENCOUNTER — HOSPITAL ENCOUNTER (OUTPATIENT)
Dept: MAMMOGRAPHY | Facility: CLINIC | Age: 50
Discharge: HOME | End: 2020-09-26
Payer: COMMERCIAL

## 2020-09-26 VITALS — BODY MASS INDEX: 27.8 KG/M2 | HEIGHT: 66 IN | WEIGHT: 173 LBS

## 2020-09-26 DIAGNOSIS — Z12.31 ENCOUNTER FOR SCREENING MAMMOGRAM FOR MALIGNANT NEOPLASM OF BREAST: ICD-10-CM

## 2020-09-26 PROCEDURE — 77063 BREAST TOMOSYNTHESIS BI: CPT

## 2020-09-26 PROCEDURE — 77067 SCR MAMMO BI INCL CAD: CPT

## 2021-01-07 ENCOUNTER — OFFICE VISIT (OUTPATIENT)
Dept: INTERNAL MEDICINE CLINIC | Facility: CLINIC | Age: 51
End: 2021-01-07
Payer: COMMERCIAL

## 2021-01-07 VITALS
SYSTOLIC BLOOD PRESSURE: 124 MMHG | WEIGHT: 178 LBS | TEMPERATURE: 97.5 F | BODY MASS INDEX: 28.61 KG/M2 | OXYGEN SATURATION: 97 % | HEIGHT: 66 IN | HEART RATE: 59 BPM | DIASTOLIC BLOOD PRESSURE: 80 MMHG

## 2021-01-07 DIAGNOSIS — K21.9 GASTROESOPHAGEAL REFLUX DISEASE WITHOUT ESOPHAGITIS: ICD-10-CM

## 2021-01-07 DIAGNOSIS — E04.1 THYROID NODULE: ICD-10-CM

## 2021-01-07 DIAGNOSIS — I10 BENIGN ESSENTIAL HYPERTENSION: ICD-10-CM

## 2021-01-07 DIAGNOSIS — I10 HYPERTENSION, UNSPECIFIED TYPE: ICD-10-CM

## 2021-01-07 DIAGNOSIS — E78.5 DYSLIPIDEMIA (HIGH LDL; LOW HDL): Primary | ICD-10-CM

## 2021-01-07 PROCEDURE — 3725F SCREEN DEPRESSION PERFORMED: CPT | Performed by: INTERNAL MEDICINE

## 2021-01-07 PROCEDURE — 3079F DIAST BP 80-89 MM HG: CPT | Performed by: INTERNAL MEDICINE

## 2021-01-07 PROCEDURE — 3008F BODY MASS INDEX DOCD: CPT | Performed by: INTERNAL MEDICINE

## 2021-01-07 PROCEDURE — 3074F SYST BP LT 130 MM HG: CPT | Performed by: INTERNAL MEDICINE

## 2021-01-07 PROCEDURE — 99214 OFFICE O/P EST MOD 30 MIN: CPT | Performed by: INTERNAL MEDICINE

## 2021-01-07 PROCEDURE — 1036F TOBACCO NON-USER: CPT | Performed by: INTERNAL MEDICINE

## 2021-01-07 RX ORDER — HYDROCHLOROTHIAZIDE 25 MG/1
25 TABLET ORAL DAILY
Qty: 90 TABLET | Refills: 1 | Status: SHIPPED | OUTPATIENT
Start: 2021-01-07 | End: 2021-07-15 | Stop reason: SDUPTHER

## 2021-01-07 RX ORDER — PROGESTERONE 50 MG/ML
INJECTION, SOLUTION INTRAMUSCULAR
COMMUNITY
End: 2022-02-01

## 2021-01-07 RX ORDER — METOPROLOL SUCCINATE 50 MG/1
TABLET, EXTENDED RELEASE ORAL
Qty: 90 TABLET | Refills: 1 | Status: SHIPPED | OUTPATIENT
Start: 2021-01-07 | End: 2021-07-15 | Stop reason: SDUPTHER

## 2021-01-07 RX ORDER — FERROUS SULFATE 325(65) MG
325 TABLET ORAL
COMMUNITY
End: 2022-02-01

## 2021-01-07 RX ORDER — LISINOPRIL 40 MG/1
40 TABLET ORAL DAILY
Qty: 90 TABLET | Refills: 1 | Status: SHIPPED | OUTPATIENT
Start: 2021-01-07 | End: 2021-07-15 | Stop reason: SDUPTHER

## 2021-01-07 NOTE — ASSESSMENT & PLAN NOTE
Well controlled  Continue hydrochlorothiazide 25 mg daily, lisinopril 40 mg daily, metoprolol succinate 50 mg daily

## 2021-01-07 NOTE — PROGRESS NOTES
Assessment/Plan:    Gastroesophageal reflux disease without esophagitis  Stable  Continue over-the-counter antacids as needed  Thyroid nodule  Stable  Follow-up with endocrinology  Benign essential hypertension  Well controlled  Continue hydrochlorothiazide 25 mg daily, lisinopril 40 mg daily, metoprolol succinate 50 mg daily  Dyslipidemia (high LDL; low HDL)  Discontinued fenofibrate for concern of adverse effects (hives)  Recheck lipid panel in 6 months   BMI 30 0-30 9,adult  Discussed lifestyle modification with diet, exercise, weight loss  Diagnoses and all orders for this visit:    Dyslipidemia (high LDL; low HDL)  -     Lipid panel; Future  -     Comprehensive metabolic panel; Future  -     CBC; Future    Benign essential hypertension  -     Lipid panel; Future  -     Comprehensive metabolic panel; Future  -     CBC; Future  -     lisinopril (ZESTRIL) 40 mg tablet; Take 1 tablet (40 mg total) by mouth daily  -     hydrochlorothiazide (HYDRODIURIL) 25 mg tablet; Take 1 tablet (25 mg total) by mouth daily    Gastroesophageal reflux disease without esophagitis  -     Comprehensive metabolic panel; Future  -     CBC; Future    BMI 30 0-30 9,adult    Hypertension, unspecified type  -     metoprolol succinate (TOPROL-XL) 50 mg 24 hr tablet; Take 1 tablet daily    Thyroid nodule    Other orders  -     Ascorbic Acid 100 MG CHEW; Chew 500 mg daily  -     Cholecalciferol 75 MCG (3000 UT) TABS; Take 2,500 Units by mouth daily  -     ferrous sulfate 325 (65 Fe) mg tablet; Take 325 mg by mouth  -     progesterone 50 mg/mL injection; progesterone 50 mg capsule   TAKE ONE CAPSULE BY MOUTH ONCE DAILY  -     NON FORMULARY; adrenevive          BMI Counseling: Body mass index is 29 17 kg/m²   The BMI is above normal  Nutrition recommendations include decreasing portion sizes, encouraging healthy choices of fruits and vegetables, decreasing fast food intake, consuming healthier snacks, limiting drinks that contain sugar, moderation in carbohydrate intake, increasing intake of lean protein, reducing intake of saturated and trans fat and reducing intake of cholesterol  Exercise recommendations include moderate physical activity 150 minutes/week and exercising 3-5 times per week  No pharmacotherapy was ordered  Patient referred to PCP due to patient being overweight  Depression Screening and Follow-up Plan: Patient's depression screening was positive with a PHQ-2 score of 0  Clincally patient does not have depression  No treatment is required  Patient advised to follow-up with PCP for further management  Time spent during encounter: 25 minutes (counseling, reviewing medications, and discussing treatment and plan)    Subjective:      Patient ID: Lizzeth Jacobs is a 44 y o  female  Chief Complaint   Patient presents with    Follow-up     follow up and discuss reaction to meds  44year old female is seen today for follow up of chronic conditions  No labs to review today  She has been compliant with her medication regimen  She started to develop a diffuse rash since starting fenofibrate  She also started progesterone around the same time as fenofibrate  Otherwise, she has no active complaints or concerns at this time  Hypertension  This is a chronic problem  The current episode started more than 1 year ago  The problem is unchanged  The problem is controlled  Past treatments include beta blockers, ACE inhibitors and diuretics  The current treatment provides moderate improvement  There are no compliance problems  Hyperlipidemia  This is a chronic problem  The current episode started more than 1 year ago  Current antihyperlipidemic treatment includes fibric acid derivatives  Compliance problems include medication side effects  Heartburn  This is a chronic problem  The current episode started more than 1 year ago  The problem occurs rarely  The problem has been unchanged   The symptoms are aggravated by certain foods  She has tried an antacid for the symptoms  The treatment provided moderate relief         The following portions of the patient's history were reviewed and updated as appropriate: allergies, current medications, past family history, past medical history, past social history, past surgical history and problem list     Review of Systems      Past Medical History:   Diagnosis Date    Benign neoplasm of parotid gland     left    Flexural eczema 2020    Hypertension     Urticaria 3/20/2019         Current Outpatient Medications:     Ascorbic Acid 100 MG CHEW, Chew 500 mg daily, Disp: , Rfl:     Cholecalciferol 75 MCG (3000 UT) TABS, Take 2,500 Units by mouth daily, Disp: , Rfl:     ferrous sulfate 325 (65 Fe) mg tablet, Take 325 mg by mouth, Disp: , Rfl:     hydrochlorothiazide (HYDRODIURIL) 25 mg tablet, Take 1 tablet (25 mg total) by mouth daily, Disp: 90 tablet, Rfl: 1    lisinopril (ZESTRIL) 40 mg tablet, Take 1 tablet (40 mg total) by mouth daily, Disp: 90 tablet, Rfl: 1    metoprolol succinate (TOPROL-XL) 50 mg 24 hr tablet, Take 1 tablet daily, Disp: 90 tablet, Rfl: 1    NON FORMULARY, adrenevive, Disp: , Rfl:     progesterone 50 mg/mL injection, progesterone 50 mg capsule  TAKE ONE CAPSULE BY MOUTH ONCE DAILY, Disp: , Rfl:     Allergies   Allergen Reactions    Amlodipine Edema and Swelling    Other      seasonal       Social History   Past Surgical History:   Procedure Laterality Date     SECTION      EAR SURGERY      SALIVARY GLAND SURGERY      TONSILECTOMY AND ADNOIDECTOMY       Family History   Problem Relation Age of Onset    Coronary artery disease Mother         age 52    Coronary artery disease Father         age 61    Breast cancer Sister         age 52   [facility] No Known Problems Daughter     No Known Problems Maternal Grandmother     No Known Problems Maternal Grandfather     No Known Problems Paternal Grandmother     No Known Problems Paternal Grandfather     No Known Problems Son     No Known Problems Maternal Aunt     No Known Problems Paternal Aunt     No Known Problems Paternal Aunt        Objective:  /80 (BP Location: Left arm, Patient Position: Sitting, Cuff Size: Adult)   Pulse 59   Temp 97 5 °F (36 4 °C)   Ht 5' 5 5" (1 664 m)   Wt 80 7 kg (178 lb)   SpO2 97%   BMI 29 17 kg/m²     No results found for this or any previous visit (from the past 1344 hour(s))  Physical Exam  Vitals signs and nursing note reviewed  Constitutional:       General: She is not in acute distress  Appearance: She is well-developed  She is not diaphoretic  HENT:      Head: Normocephalic and atraumatic  Eyes:      General:         Right eye: No discharge  Left eye: No discharge  Conjunctiva/sclera: Conjunctivae normal       Pupils: Pupils are equal, round, and reactive to light  Neck:      Musculoskeletal: Normal range of motion and neck supple  Thyroid: No thyromegaly  Vascular: No JVD  Cardiovascular:      Rate and Rhythm: Normal rate and regular rhythm  Heart sounds: Normal heart sounds  No murmur  No friction rub  No gallop  Pulmonary:      Effort: Pulmonary effort is normal  No respiratory distress  Breath sounds: Normal breath sounds  No wheezing or rales  Chest:      Chest wall: No tenderness  Abdominal:      General: There is no distension  Palpations: Abdomen is soft  Tenderness: There is no abdominal tenderness  Musculoskeletal: Normal range of motion  General: No tenderness or deformity  Lymphadenopathy:      Cervical: No cervical adenopathy  Skin:     General: Skin is warm and dry  Coloration: Skin is not pale  Findings: No erythema or rash  Neurological:      Mental Status: She is alert and oriented to person, place, and time  Cranial Nerves: No cranial nerve deficit        Coordination: Coordination normal    Psychiatric: Behavior: Behavior normal          Thought Content:  Thought content normal          Judgment: Judgment normal

## 2021-02-04 ENCOUNTER — VBI (OUTPATIENT)
Dept: ADMINISTRATIVE | Facility: OTHER | Age: 51
End: 2021-02-04

## 2021-03-17 ENCOUNTER — TELEPHONE (OUTPATIENT)
Dept: INTERNAL MEDICINE CLINIC | Facility: CLINIC | Age: 51
End: 2021-03-17

## 2021-03-17 DIAGNOSIS — Z12.11 SCREENING FOR MALIGNANT NEOPLASM OF COLON: Primary | ICD-10-CM

## 2021-03-17 NOTE — TELEPHONE ENCOUNTER
Spoke with patient about the importance on screening the CRC  We spoke about the options and patient agreed on getting done the cologuard

## 2021-03-22 NOTE — TELEPHONE ENCOUNTER
Patient dropped Cologuard kit today 3/22/2021  Sent to [facility]  Confirmed # [id number]    *Patient forgot little thank you bag for CRC awareness

## 2021-03-30 DIAGNOSIS — Z23 ENCOUNTER FOR IMMUNIZATION: ICD-10-CM

## 2021-03-30 NOTE — PROGRESS NOTES
Called patient and informed her Negative Cologuard results for CRC and needs to be done in 3 years  Patient did not have any further questions

## 2021-07-11 LAB
ALBUMIN SERPL-MCNC: 4.8 G/DL (ref 3.6–5.1)
ALBUMIN/GLOB SERPL: 1.8 (CALC) (ref 1–2.5)
ALP SERPL-CCNC: 50 U/L (ref 37–153)
ALT SERPL-CCNC: 13 U/L (ref 6–29)
AST SERPL-CCNC: 15 U/L (ref 10–35)
BASOPHILS # BLD AUTO: 57 CELLS/UL (ref 0–200)
BASOPHILS NFR BLD AUTO: 0.8 %
BILIRUB SERPL-MCNC: 0.6 MG/DL (ref 0.2–1.2)
BUN SERPL-MCNC: 14 MG/DL (ref 7–25)
BUN/CREAT SERPL: ABNORMAL (CALC) (ref 6–22)
CALCIUM SERPL-MCNC: 10.1 MG/DL (ref 8.6–10.4)
CHLORIDE SERPL-SCNC: 98 MMOL/L (ref 98–110)
CHOLEST SERPL-MCNC: 282 MG/DL
CHOLEST/HDLC SERPL: 6.9 (CALC)
CO2 SERPL-SCNC: 31 MMOL/L (ref 20–32)
CREAT SERPL-MCNC: 0.79 MG/DL (ref 0.5–1.05)
EOSINOPHIL # BLD AUTO: 121 CELLS/UL (ref 15–500)
EOSINOPHIL NFR BLD AUTO: 1.7 %
ERYTHROCYTE [DISTWIDTH] IN BLOOD BY AUTOMATED COUNT: 12.3 % (ref 11–15)
GLOBULIN SER CALC-MCNC: 2.6 G/DL (CALC) (ref 1.9–3.7)
GLUCOSE SERPL-MCNC: 101 MG/DL (ref 65–99)
HCT VFR BLD AUTO: 45.7 % (ref 35–45)
HDLC SERPL-MCNC: 41 MG/DL
HGB BLD-MCNC: 15.5 G/DL (ref 11.7–15.5)
LDLC SERPL CALC-MCNC: 192 MG/DL (CALC)
LYMPHOCYTES # BLD AUTO: 2286 CELLS/UL (ref 850–3900)
LYMPHOCYTES NFR BLD AUTO: 32.2 %
MCH RBC QN AUTO: 32 PG (ref 27–33)
MCHC RBC AUTO-ENTMCNC: 33.9 G/DL (ref 32–36)
MCV RBC AUTO: 94.2 FL (ref 80–100)
MONOCYTES # BLD AUTO: 611 CELLS/UL (ref 200–950)
MONOCYTES NFR BLD AUTO: 8.6 %
NEUTROPHILS # BLD AUTO: 4026 CELLS/UL (ref 1500–7800)
NEUTROPHILS NFR BLD AUTO: 56.7 %
NONHDLC SERPL-MCNC: 241 MG/DL (CALC)
PLATELET # BLD AUTO: 303 THOUSAND/UL (ref 140–400)
PMV BLD REES-ECKER: 10 FL (ref 7.5–12.5)
POTASSIUM SERPL-SCNC: 3.8 MMOL/L (ref 3.5–5.3)
PROT SERPL-MCNC: 7.4 G/DL (ref 6.1–8.1)
RBC # BLD AUTO: 4.85 MILLION/UL (ref 3.8–5.1)
SL AMB EGFR AFRICAN AMERICAN: 100 ML/MIN/1.73M2
SL AMB EGFR NON AFRICAN AMERICAN: 86 ML/MIN/1.73M2
SODIUM SERPL-SCNC: 137 MMOL/L (ref 135–146)
TRIGL SERPL-MCNC: 274 MG/DL
WBC # BLD AUTO: 7.1 THOUSAND/UL (ref 3.8–10.8)

## 2021-07-15 ENCOUNTER — OFFICE VISIT (OUTPATIENT)
Dept: INTERNAL MEDICINE CLINIC | Facility: CLINIC | Age: 51
End: 2021-07-15
Payer: COMMERCIAL

## 2021-07-15 VITALS
OXYGEN SATURATION: 95 % | HEIGHT: 67 IN | TEMPERATURE: 98 F | DIASTOLIC BLOOD PRESSURE: 78 MMHG | BODY MASS INDEX: 27.34 KG/M2 | SYSTOLIC BLOOD PRESSURE: 120 MMHG | WEIGHT: 174.2 LBS | HEART RATE: 65 BPM

## 2021-07-15 DIAGNOSIS — K21.9 GASTROESOPHAGEAL REFLUX DISEASE WITHOUT ESOPHAGITIS: ICD-10-CM

## 2021-07-15 DIAGNOSIS — I10 BENIGN ESSENTIAL HYPERTENSION: ICD-10-CM

## 2021-07-15 DIAGNOSIS — I10 HYPERTENSION, UNSPECIFIED TYPE: ICD-10-CM

## 2021-07-15 DIAGNOSIS — Z12.31 ENCOUNTER FOR SCREENING MAMMOGRAM FOR MALIGNANT NEOPLASM OF BREAST: Primary | ICD-10-CM

## 2021-07-15 DIAGNOSIS — E78.5 DYSLIPIDEMIA (HIGH LDL; LOW HDL): ICD-10-CM

## 2021-07-15 DIAGNOSIS — E04.1 THYROID NODULE: ICD-10-CM

## 2021-07-15 PROBLEM — R79.89 ABNORMAL THYROID BLOOD TEST: Status: RESOLVED | Noted: 2020-08-04 | Resolved: 2021-07-15

## 2021-07-15 PROCEDURE — 99214 OFFICE O/P EST MOD 30 MIN: CPT | Performed by: INTERNAL MEDICINE

## 2021-07-15 RX ORDER — HYDROCHLOROTHIAZIDE 25 MG/1
25 TABLET ORAL DAILY
Qty: 90 TABLET | Refills: 1 | Status: SHIPPED | OUTPATIENT
Start: 2021-07-15 | End: 2022-02-01 | Stop reason: SDUPTHER

## 2021-07-15 RX ORDER — LISINOPRIL 40 MG/1
40 TABLET ORAL DAILY
Qty: 90 TABLET | Refills: 1 | Status: SHIPPED | OUTPATIENT
Start: 2021-07-15 | End: 2022-02-01 | Stop reason: SDUPTHER

## 2021-07-15 RX ORDER — METOPROLOL SUCCINATE 50 MG/1
TABLET, EXTENDED RELEASE ORAL
Qty: 90 TABLET | Refills: 1 | Status: SHIPPED | OUTPATIENT
Start: 2021-07-15 | End: 2022-02-01 | Stop reason: SDUPTHER

## 2021-07-15 NOTE — ASSESSMENT & PLAN NOTE
Likely due to projection therapy  She will discussed with her endocrinologist regarding continuing projection therapy  If decision is to continue, will start statin therapy

## 2022-02-01 ENCOUNTER — OFFICE VISIT (OUTPATIENT)
Dept: INTERNAL MEDICINE CLINIC | Facility: OTHER | Age: 52
End: 2022-02-01
Payer: COMMERCIAL

## 2022-02-01 VITALS
HEART RATE: 49 BPM | WEIGHT: 167.4 LBS | HEIGHT: 67 IN | SYSTOLIC BLOOD PRESSURE: 138 MMHG | RESPIRATION RATE: 20 BRPM | TEMPERATURE: 97.5 F | BODY MASS INDEX: 26.27 KG/M2 | OXYGEN SATURATION: 98 % | DIASTOLIC BLOOD PRESSURE: 86 MMHG

## 2022-02-01 DIAGNOSIS — E66.3 OVERWEIGHT (BMI 25.0-29.9): ICD-10-CM

## 2022-02-01 DIAGNOSIS — E04.1 THYROID NODULE: ICD-10-CM

## 2022-02-01 DIAGNOSIS — I10 HYPERTENSION, UNSPECIFIED TYPE: ICD-10-CM

## 2022-02-01 DIAGNOSIS — K21.9 GASTROESOPHAGEAL REFLUX DISEASE WITHOUT ESOPHAGITIS: Primary | ICD-10-CM

## 2022-02-01 DIAGNOSIS — E78.5 DYSLIPIDEMIA (HIGH LDL; LOW HDL): ICD-10-CM

## 2022-02-01 DIAGNOSIS — I10 BENIGN ESSENTIAL HYPERTENSION: ICD-10-CM

## 2022-02-01 PROCEDURE — 3008F BODY MASS INDEX DOCD: CPT | Performed by: INTERNAL MEDICINE

## 2022-02-01 PROCEDURE — 99214 OFFICE O/P EST MOD 30 MIN: CPT | Performed by: INTERNAL MEDICINE

## 2022-02-01 PROCEDURE — 3075F SYST BP GE 130 - 139MM HG: CPT | Performed by: INTERNAL MEDICINE

## 2022-02-01 PROCEDURE — 1036F TOBACCO NON-USER: CPT | Performed by: INTERNAL MEDICINE

## 2022-02-01 PROCEDURE — 3079F DIAST BP 80-89 MM HG: CPT | Performed by: INTERNAL MEDICINE

## 2022-02-01 RX ORDER — CHLORAL HYDRATE 500 MG
6000 CAPSULE ORAL 2 TIMES DAILY
COMMUNITY

## 2022-02-01 RX ORDER — AMPICILLIN TRIHYDRATE 250 MG
600 CAPSULE ORAL 2 TIMES DAILY
COMMUNITY

## 2022-02-01 RX ORDER — LISINOPRIL 40 MG/1
40 TABLET ORAL DAILY
Qty: 90 TABLET | Refills: 1 | Status: SHIPPED | OUTPATIENT
Start: 2022-02-01 | End: 2022-08-01 | Stop reason: SDUPTHER

## 2022-02-01 RX ORDER — METOPROLOL SUCCINATE 50 MG/1
TABLET, EXTENDED RELEASE ORAL
Qty: 90 TABLET | Refills: 1 | Status: SHIPPED | OUTPATIENT
Start: 2022-02-01 | End: 2022-08-01 | Stop reason: SDUPTHER

## 2022-02-01 RX ORDER — HYDROCHLOROTHIAZIDE 25 MG/1
25 TABLET ORAL DAILY
Qty: 90 TABLET | Refills: 1 | Status: SHIPPED | OUTPATIENT
Start: 2022-02-01 | End: 2022-08-01 | Stop reason: SDUPTHER

## 2022-02-01 NOTE — ASSESSMENT & PLAN NOTE
Controlled  Continue current antihypertensive regimen:  Lisinopril 40 mg daily, hydrochlorothiazide 25 mg daily, metoprolol succinate 50 mg daily

## 2022-02-01 NOTE — PROGRESS NOTES
Assessment/Plan:    Gastroesophageal reflux disease without esophagitis  Stable  Continue over-the-counter antacids as needed  Thyroid nodule  Asymptomatic  Continue to monitor clinically  Benign essential hypertension  Controlled  Continue current antihypertensive regimen:  Lisinopril 40 mg daily, hydrochlorothiazide 25 mg daily, metoprolol succinate 50 mg daily  Dyslipidemia (high LDL; low HDL)  Improving however elevated  Continue dietary modifications and red yeast rice extract  Diagnoses and all orders for this visit:    Gastroesophageal reflux disease without esophagitis    Benign essential hypertension  -     lisinopril (ZESTRIL) 40 mg tablet; Take 1 tablet (40 mg total) by mouth daily  -     hydrochlorothiazide (HYDRODIURIL) 25 mg tablet; Take 1 tablet (25 mg total) by mouth daily    Hypertension, unspecified type  -     metoprolol succinate (TOPROL-XL) 50 mg 24 hr tablet; Take 1 tablet daily    Overweight (BMI 25 0-29  9)    Dyslipidemia (high LDL; low HDL)    Thyroid nodule    Other orders  -     Red Yeast Rice 600 MG CAPS; Take 600 capsules by mouth 2 (two) times a day  -     co-enzyme Q-10 30 MG capsule; Take 300 mg by mouth daily  -     Omega-3 Fatty Acids (fish oil) 1,000 mg; Take 6,000 mg by mouth 2 (two) times a day  -     ALPHA LIPOIC ACID-BIOTIN PO; Take 600 capsules by mouth in the morning  -     Arginine 1000 MG TABS; Take 1,000 tablets by mouth in the morning          BMI Counseling: Body mass index is 26 02 kg/m²  The BMI is above normal  Nutrition recommendations include decreasing portion sizes, encouraging healthy choices of fruits and vegetables, decreasing fast food intake, consuming healthier snacks, limiting drinks that contain sugar, moderation in carbohydrate intake, increasing intake of lean protein, reducing intake of saturated and trans fat and reducing intake of cholesterol   Exercise recommendations include moderate physical activity 150 minutes/week and exercising 3-5 times per week  No pharmacotherapy was ordered  Patient referred to PCP  Rationale for BMI follow-up plan is due to patient being overweight or obese  Depression Screening and Follow-up Plan: Patient was screened for depression during today's encounter  They screened negative with a PHQ-2 score of 0  Subjective:      Patient ID: Nathalie Kern is a 46 y o  female  Chief Complaint   Patient presents with    Follow-up     6 month, labs done 1/29/22  no issues    health maintenace     hep c, annual exam, lanny ordered in July, phq, bmi adult, bmi f/u plan       61-year-old female seen today for follow-up of chronic conditions  Laboratory studies reviewed today, lipid panel shows improvement in total cholesterol and LDL of slightly  Levels continue to remain elevated  She has made dietary modifications and has been eating healthier  She has lost approximately 7 lb since last visit  She has no active complaints or concerns at this time  She has been compliant with her medication regimen  Hypertension  This is a chronic problem  The current episode started more than 1 year ago  The problem is unchanged  The problem is controlled  Pertinent negatives include no chest pain, headaches, palpitations or shortness of breath  Past treatments include beta blockers, ACE inhibitors and diuretics  The current treatment provides moderate improvement  There are no compliance problems  Hyperlipidemia  This is a chronic problem  The current episode started more than 1 year ago  The problem is controlled  Recent lipid tests were reviewed and are normal  Pertinent negatives include no chest pain or shortness of breath  Current antihyperlipidemic treatment includes diet change  The current treatment provides moderate improvement of lipids  There are no compliance problems          The following portions of the patient's history were reviewed and updated as appropriate: allergies, current medications, past family history, past medical history, past social history, past surgical history and problem list     Review of Systems   Constitutional: Negative for activity change, appetite change, chills, diaphoresis, fatigue and fever  HENT: Negative for congestion, postnasal drip, rhinorrhea, sinus pressure, sinus pain, sneezing and sore throat  Eyes: Negative for visual disturbance  Respiratory: Negative for apnea, cough, choking, chest tightness, shortness of breath and wheezing  Cardiovascular: Negative for chest pain, palpitations and leg swelling  Gastrointestinal: Negative for abdominal distention, abdominal pain, anal bleeding, blood in stool, constipation, diarrhea, nausea and vomiting  Endocrine: Negative for cold intolerance and heat intolerance  Genitourinary: Negative for difficulty urinating, dysuria and hematuria  Musculoskeletal: Negative  Skin: Negative  Neurological: Negative for dizziness, weakness, light-headedness, numbness and headaches  Hematological: Negative for adenopathy  Psychiatric/Behavioral: Negative for agitation, sleep disturbance and suicidal ideas  All other systems reviewed and are negative          Past Medical History:   Diagnosis Date    Benign neoplasm of parotid gland     left    Flexural eczema 4/14/2020    Hypertension     Urticaria 3/20/2019         Current Outpatient Medications:     ALPHA LIPOIC ACID-BIOTIN PO, Take 600 capsules by mouth in the morning, Disp: , Rfl:     Arginine 1000 MG TABS, Take 1,000 tablets by mouth in the morning, Disp: , Rfl:     Ascorbic Acid 100 MG CHEW, Chew 1,000 mg daily , Disp: , Rfl:     Cholecalciferol 75 MCG (3000 UT) TABS, Take 5,000 Units by mouth daily  , Disp: , Rfl:     co-enzyme Q-10 30 MG capsule, Take 300 mg by mouth daily, Disp: , Rfl:     hydrochlorothiazide (HYDRODIURIL) 25 mg tablet, Take 1 tablet (25 mg total) by mouth daily, Disp: 90 tablet, Rfl: 1    lisinopril (ZESTRIL) 40 mg tablet, Take 1 tablet (40 mg total) by mouth daily, Disp: 90 tablet, Rfl: 1    metoprolol succinate (TOPROL-XL) 50 mg 24 hr tablet, Take 1 tablet daily, Disp: 90 tablet, Rfl: 1    NON FORMULARY, adrenevive, Disp: , Rfl:     Omega-3 Fatty Acids (fish oil) 1,000 mg, Take 6,000 mg by mouth 2 (two) times a day, Disp: , Rfl:     Red Yeast Rice 600 MG CAPS, Take 600 capsules by mouth 2 (two) times a day, Disp: , Rfl:     Allergies   Allergen Reactions    Amlodipine Edema and Swelling    Other      seasonal       Social History   Past Surgical History:   Procedure Laterality Date     SECTION      EAR SURGERY      SALIVARY GLAND SURGERY      TONSILECTOMY AND ADNOIDECTOMY       Family History   Problem Relation Age of Onset    Coronary artery disease Mother         age 52    Coronary artery disease Father         age 61    Breast cancer Sister         age 52   [employer] No Known Problems Daughter     No Known Problems Maternal Grandmother     No Known Problems Maternal Grandfather     No Known Problems Paternal Grandmother     No Known Problems Paternal Grandfather     No Known Problems Son     No Known Problems Maternal Aunt     No Known Problems Paternal Aunt     No Known Problems Paternal Aunt        Objective:  /86 (BP Location: Left arm, Patient Position: Sitting, Cuff Size: Standard)   Pulse (!) 49   Temp 97 5 °F (36 4 °C) (Temporal)   Resp 20   Ht 5' 7 25" (1 708 m)   Wt 75 9 kg (167 lb 6 4 oz)   SpO2 98%   BMI 26 02 kg/m²     No results found for this or any previous visit (from the past 1344 hour(s))  Physical Exam  Vitals and nursing note reviewed  Constitutional:       General: She is not in acute distress  Appearance: She is well-developed  She is not diaphoretic  HENT:      Head: Normocephalic and atraumatic  Eyes:      General:         Right eye: No discharge  Left eye: No discharge        Conjunctiva/sclera: Conjunctivae normal       Pupils: Pupils are equal, round, and reactive to light  Neck:      Thyroid: No thyromegaly  Vascular: No JVD  Cardiovascular:      Rate and Rhythm: Normal rate and regular rhythm  Heart sounds: Normal heart sounds  No murmur heard  No friction rub  No gallop  Pulmonary:      Effort: Pulmonary effort is normal  No respiratory distress  Breath sounds: Normal breath sounds  No wheezing or rales  Chest:      Chest wall: No tenderness  Abdominal:      General: There is no distension  Palpations: Abdomen is soft  Tenderness: There is no abdominal tenderness  Musculoskeletal:         General: No tenderness or deformity  Normal range of motion  Cervical back: Normal range of motion and neck supple  Lymphadenopathy:      Cervical: No cervical adenopathy  Skin:     General: Skin is warm and dry  Coloration: Skin is not pale  Findings: No erythema or rash  Neurological:      Mental Status: She is alert and oriented to person, place, and time  Cranial Nerves: No cranial nerve deficit  Coordination: Coordination normal    Psychiatric:         Behavior: Behavior normal          Thought Content:  Thought content normal          Judgment: Judgment normal

## 2022-04-12 NOTE — TELEPHONE ENCOUNTER
COLOGARD WAS ORDERED ON 09/18/2019 IN [employer] [facility]ITE  RHEUMATOLOGY FOLLOW UP VISIT    Name: Fanny Sorto  : 1974     Referred by: Alejandro Nielsen MD    Chief Complaint   Patient presents with   â¢ Rheumatoid Arthritis     The [address] [place] [address] makes medical notes like these available to patients in the interest of transparency. Please be advised that this is a medical document. Medical documents are intended to carry relevant information, facts as evident, and the clinical opinion of the practitioner. The medical note is intended as peer to peer communication, and may appear blunt or direct. It is written in medical language, and may contain abbreviations or verbiage that are unfamiliar. HISTORY OF PRESENT ILLNESS    This is a very pleasant  50year old female here today for seropositive rheumatoid arthritis. Â Patient has a history of thyroid cancer with a total thyroidectomy. Â She now has acquired hypothyroidism. Darryn Moeller  She has a history of rheumatoid arthritisÂ diagnosed in  by Dr Leisa Forrest, she saw Dr Kei Martinez once Dr Leisa Forrest retired, and then she was briefly under the care of Dr. Latrice Tyson. Review of past labs reveal a positive anti-CCP antibody of 157 Units and a normal rheumatoid factor consistent with the diagnosis of seropositive rheumatoid arthritis. [facility]  She had recent labs with aÂ mildly elevatedÂ Â ESR of 21Â mm/hr, her CMP reveals good  kidney and liver function, and her CBC reveals normal WBC and platelet count and no anemia on lab , her C-RP is normal   Â   She is on methotrexate 20 mg weekly and folic acid 1 mg daily      She has just mild pain, mild swelling of her right leg at times , she reports when she does have morning stiffness it lasts about 15 minutes     REVIEW OF SYSTEMS  Constitutional: no fevers or chills + weight loss of 6 pounds   EYES: No dry eyes, no blurred vision, no diplopia, no history of iritis  ENT:  no dry mouth,no  Nasal or oral ulcers, no dysphagia, no chronic sinus disease   Cardiovascular: no chest pain orthopnea no "PND no palpitations  Respiratory: no cough no shortness of breath no wheezing no stridor no dyspnea on exertion  GI: no nausea, no vomiting, no diarrhea, no constipation no heartburn  :no dysuria urgency no hematuria  EMILY: as above no raynauds  Heme:no history of deep venous thrombosis or pulmonary embolism no anemia, no swollen glands  CNS: no tingling or numbness,no weakness, no ataxia  PSY :no anxiety or depression  INTEGUMENTARY: no hair loss, no rashes, hair thinning   ENDO: No polyuria, no polydipsia    Past Medical History:   Diagnosis Date   â¢ Rheumatoid arthritis (CMS/HCC)    â¢ Thyroid disease         Past Surgical History:   Procedure Laterality Date   â¢ Cyst removal     â¢ Thyroidectomy  2017       Social History     Tobacco Use   â¢ Smoking status: Never Smoker   â¢ Smokeless tobacco: Never Used   Vaping Use   â¢ Vaping Use: never used   Substance Use Topics   â¢ Alcohol use: Yes       Current Outpatient Medications   Medication Sig Last Dose   â¢ [START ON 4/18/2022] methotrexate 2.5 MG Tab Take 8 tablets by mouth 1 day a week. â¢ levothyroxine 88 MCG tablet Take 1 tablet by mouth daily. Taking at Unknown time   â¢ folic acid (FOLATE) 1 MG tablet Take 1 tablet by mouth daily. Taking at Unknown time     No current facility-administered medications for this visit. PHYSICAL EXAM    Vitals:    04/12/22 1620   BP: 107/67   BP Location: RUE - Right upper extremity   Patient Position: Sitting   Pulse: 63   Resp: 16   Temp: 97.8 Â°F (36.6 Â°C)   SpO2: 98%   Weight: 70.8 kg (155 lb 15.6 oz)   Height: 5' 5"" (1.651 m)   PainSc:  0     Body mass index is 25.96 kg/mÂ². Skin: no ulcers, no malar rash, no petechia no purpura. Head and Face: Atraumatic no deformities normocephalic normal facies  Eyes: Pink conjunctiva, anicteric sclera, no periorbital swelling, no ptosis, pupils reactive to light, extraocular muscles intact. No dry eyes.     ENT: No oral ulcers, no nasal ulcers,  no sinus tenderness, no " malar rash, no temporal artery tenderness, no oral thrush, no dry mouth, no oral ulcers. No TMJ tenderness     Neck: Fairly good range of motion of C-spine, no paracervical tenderness, no goiter, no adenopathy, no supraclavicular masses. Cardiac exam: S1-S2 regular, no murmurs. Lungs: clear, no rales or wheezing, no abnormal breath sounds, good breath sounds bilaterally. Abdomen: no hepatomegaly or splenomegaly or tenderness, no masses, no ascites. Back: no SI joint tenderness, no paralumbar tenderness,   Musculoskeletal exam:  Good range of motion bilateral shoulder joint with no tenderness  Bilateral elbows no synovitis or tenderness  Bilateral wrist joints no synovitis or tenderness  Bilateral MCP joints no synovitis or tenderness  Bilateral PIP joints no synovitis and tenderness  Bilateral DIP joints no synovitis or tenderness  Both knees no effusion warmth or tenderness no knee instability, bilateral knee crepitus noted   Both ankles no synovitis or tenderness  Bilateral MTP joints no synovitis or tenderness no dactylitis  Good range of motion bilateral hip joints with no tenderness    Neurological exam: Normal gait, normal motor strength in upper and lower extremity, normal muscle tone, no tremors alert oriented x3.  good bilateral hand , no muscle atrophy.   Tender joints=0  Swollen joints=0  ESR= 21 mm/hr  GALAN-28 score= 2.13 indicates remission     LABS  Lab Services on 03/30/2022   Component Date Value Ref Range Status   â¢ Sodium 03/30/2022 143  135 - 145 mmol/L Final   â¢ Potassium 03/30/2022 3.8  3.4 - 5.1 mmol/L Final   â¢ Chloride 03/30/2022 108 (A) 98 - 107 mmol/L Final   â¢ Carbon Dioxide 03/30/2022 24  21 - 32 mmol/L Final   â¢ Anion Gap 03/30/2022 15  10 - 20 mmol/L Final   â¢ Glucose 03/30/2022 126 (A) 70 - 99 mg/dL Final   â¢ BUN 03/30/2022 16  6 - 20 mg/dL Final   â¢ Creatinine 03/30/2022 0.59  0.51 - 0.95 mg/dL Final   â¢ Glomerular Filtration Rate 03/30/2022 >90  >=60 Final    eGFR results = or >60 mL/min/1.73m2 = Normal kidney function. Estimated GFR calculated using the 2009 CKD-EPI creatinine equation.      â¢ BUN/ Creatinine Ratio 03/30/2022 27 (A) 7 - 25 Final   â¢ Calcium 03/30/2022 9.1  8.4 - 10.2 mg/dL Final   â¢ Bilirubin, Total 03/30/2022 0.3  0.2 - 1.0 mg/dL Final   â¢ GOT/AST 03/30/2022 10  <=37 Units/L Final   â¢ GPT/ALT 03/30/2022 21  <64 Units/L Final   â¢ Alkaline Phosphatase 03/30/2022 92  45 - 117 Units/L Final   â¢ Albumin 03/30/2022 3.8  3.6 - 5.1 g/dL Final   â¢ Protein, Total 03/30/2022 7.0  6.4 - 8.2 g/dL Final   â¢ Globulin 03/30/2022 3.2  2.0 - 4.0 g/dL Final   â¢ A/G Ratio 03/30/2022 1.2  1.0 - 2.4 Final   â¢ RBC Sedimentation Rate 03/30/2022 21 (A) 0 - 20 mm/hr Final   â¢ C-Reactive Protein 03/30/2022 1.0  <=1.0 mg/dL Final   â¢ WBC 03/30/2022 7.1  4.2 - 11.0 K/mcL Final   â¢ RBC 03/30/2022 3.95 (A) 4.00 - 5.20 mil/mcL Final   â¢ HGB 03/30/2022 12.6  12.0 - 15.5 g/dL Final   â¢ HCT 03/30/2022 38.3  36.0 - 46.5 % Final   â¢ MCV 03/30/2022 97.0  78.0 - 100.0 fl Final   â¢ MCH 03/30/2022 31.9  26.0 - 34.0 pg Final   â¢ MCHC 03/30/2022 32.9  32.0 - 36.5 g/dL Final   â¢ RDW-CV 03/30/2022 13.5  11.0 - 15.0 % Final   â¢ RDW-SD 03/30/2022 47.8  39.0 - 50.0 fL Final   â¢ PLT 03/30/2022 245  140 - 450 K/mcL Final   â¢ NRBC 03/30/2022 0  <=0 /100 WBC Final   â¢ Neutrophil, Percent 03/30/2022 79  % Final   â¢ Lymphocytes, Percent 03/30/2022 14  % Final   â¢ Mono, Percent 03/30/2022 4  % Final   â¢ Eosinophils, Percent 03/30/2022 2  % Final   â¢ Basophils, Percent 03/30/2022 1  % Final   â¢ Immature Granulocytes 03/30/2022 0  % Final   â¢ Absolute Neutrophils 03/30/2022 5.7  1.8 - 7.7 K/mcL Final   â¢ Absolute Lymphocytes 03/30/2022 1.0  1.0 - 4.8 K/mcL Final   â¢ Absolute Monocytes 03/30/2022 0.3  0.3 - 0.9 K/mcL Final   â¢ Absolute Eosinophils  03/30/2022 0.1  0.0 - 0.5 K/mcL Final   â¢ Absolute Basophils 03/30/2022 0.0  0.0 - 0.3 K/mcL Final   â¢ Absolute Immmature Granulocytes 03/30/2022 0.0  0.0 - 0.2 K/mcL Final             ASSESSMENT  Impression: Very pleasant 50year old female here today with rheumatoid arthritis doing well on today's visit. I will continue her on her current medication regimen   Problem List Items Addressed This Visit     None      Visit Diagnoses     Rheumatoid arthritis with positive rheumatoid factor, involving unspecified site (CMS/[facility])    -  Primary    Relevant Medications    methotrexate 2.5 MG Tab (Start on 4/18/2022)    Other Relevant Orders    CBC WITH DIFFERENTIAL    COMPREHENSIVE METABOLIC PANEL    SEDIMENTATION RATE WESTERGREN    C REACTIVE PROTEIN    High risk medication use        Relevant Orders    CBC WITH DIFFERENTIAL    COMPREHENSIVE METABOLIC PANEL    SEDIMENTATION RATE WESTERGREN    C REACTIVE PROTEIN         PLAN  Orders Placed This Encounter   â¢ CBC with Automated Differential   â¢ Comprehensive Metabolic Panel   â¢ Sedimentation Rate Westergren   â¢ C Reactive Protein   â¢ methotrexate 2.5 MG Tab     Labs placed for CBC, CMP, ESR and C-RP     Continue methotrexate 20 mg weekly     Continue folic acid 1 mg daily     Risks of medical conditions and side effects of medication were discussed. Medical compliance with plan discussed and risks of non-compliance reviewed. Patient education completed on disease process, etiology & prognosis. Patient expresses understanding of the plan. Return to clinic in three months  as clinically indicated as discussed with patient who verbalized understanding of & agreement with the plan.        Bia Bunn, CNP

## 2022-07-25 ENCOUNTER — RA CDI HCC (OUTPATIENT)
Dept: OTHER | Facility: HOSPITAL | Age: 52
End: 2022-07-25

## 2022-07-25 NOTE — PROGRESS NOTES
[facility]  coding opportunities       Chart reviewed, no opportunity found: CHART REVIEWED, NO OPPORTUNITY FOUND        Patients Insurance        Commercial Insurance: [employer]

## 2022-08-01 ENCOUNTER — OFFICE VISIT (OUTPATIENT)
Dept: INTERNAL MEDICINE CLINIC | Facility: OTHER | Age: 52
End: 2022-08-01
Payer: COMMERCIAL

## 2022-08-01 VITALS
SYSTOLIC BLOOD PRESSURE: 118 MMHG | HEART RATE: 53 BPM | WEIGHT: 161 LBS | HEIGHT: 65 IN | OXYGEN SATURATION: 97 % | DIASTOLIC BLOOD PRESSURE: 80 MMHG | BODY MASS INDEX: 26.82 KG/M2 | TEMPERATURE: 98.9 F

## 2022-08-01 DIAGNOSIS — K21.9 GASTROESOPHAGEAL REFLUX DISEASE WITHOUT ESOPHAGITIS: ICD-10-CM

## 2022-08-01 DIAGNOSIS — Z12.31 ENCOUNTER FOR SCREENING MAMMOGRAM FOR MALIGNANT NEOPLASM OF BREAST: ICD-10-CM

## 2022-08-01 DIAGNOSIS — Z11.59 NEED FOR HEPATITIS C SCREENING TEST: ICD-10-CM

## 2022-08-01 DIAGNOSIS — E04.1 THYROID NODULE: ICD-10-CM

## 2022-08-01 DIAGNOSIS — E66.3 OVERWEIGHT (BMI 25.0-29.9): ICD-10-CM

## 2022-08-01 DIAGNOSIS — Z12.4 CERVICAL CANCER SCREENING: ICD-10-CM

## 2022-08-01 DIAGNOSIS — E78.5 DYSLIPIDEMIA (HIGH LDL; LOW HDL): ICD-10-CM

## 2022-08-01 DIAGNOSIS — I10 HYPERTENSION, UNSPECIFIED TYPE: ICD-10-CM

## 2022-08-01 DIAGNOSIS — I10 BENIGN ESSENTIAL HYPERTENSION: Primary | ICD-10-CM

## 2022-08-01 PROCEDURE — 99214 OFFICE O/P EST MOD 30 MIN: CPT | Performed by: INTERNAL MEDICINE

## 2022-08-01 PROCEDURE — 3079F DIAST BP 80-89 MM HG: CPT | Performed by: INTERNAL MEDICINE

## 2022-08-01 PROCEDURE — 3074F SYST BP LT 130 MM HG: CPT | Performed by: INTERNAL MEDICINE

## 2022-08-01 PROCEDURE — 3725F SCREEN DEPRESSION PERFORMED: CPT | Performed by: INTERNAL MEDICINE

## 2022-08-01 RX ORDER — FLUCONAZOLE 150 MG/1
TABLET ORAL
COMMUNITY
Start: 2022-07-02

## 2022-08-01 RX ORDER — HYDROCHLOROTHIAZIDE 25 MG/1
25 TABLET ORAL DAILY
Qty: 90 TABLET | Refills: 1 | Status: SHIPPED | OUTPATIENT
Start: 2022-08-01

## 2022-08-01 RX ORDER — LISINOPRIL 40 MG/1
40 TABLET ORAL DAILY
Qty: 90 TABLET | Refills: 1 | Status: SHIPPED | OUTPATIENT
Start: 2022-08-01

## 2022-08-01 RX ORDER — METOPROLOL SUCCINATE 50 MG/1
TABLET, EXTENDED RELEASE ORAL
Qty: 90 TABLET | Refills: 1 | Status: SHIPPED | OUTPATIENT
Start: 2022-08-01

## 2022-08-01 NOTE — PROGRESS NOTES
Assessment/Plan:    Benign essential hypertension  Controlled  Continue current antihypertensive regimen:  Hydrochlorothiazide 25 mg daily, lisinopril 40 mg daily, and metoprolol succinate 50 mg daily  Gastroesophageal reflux disease without esophagitis  Controlled  Continue over-the-counter antacids as needed  Thyroid nodule  Asymptomatic  Continue to monitor clinically  Repeat thyroid ultrasound in 6 months  Overweight (BMI 25 0-29  9)  Discussed diet and exercise  Dyslipidemia (high LDL; low HDL)  Discussed diet and exercise  Trend lipid panel  Continue Omega 3 supplementation  Diagnoses and all orders for this visit:    Benign essential hypertension  -     hydrochlorothiazide (HYDRODIURIL) 25 mg tablet; Take 1 tablet (25 mg total) by mouth daily  -     lisinopril (ZESTRIL) 40 mg tablet; Take 1 tablet (40 mg total) by mouth daily  -     CBC; Future  -     Comprehensive metabolic panel; Future  -     Lipid panel; Future    Encounter for screening mammogram for malignant neoplasm of breast  -     Mammo screening bilateral w 3d & cad; Future    Hypertension, unspecified type  -     metoprolol succinate (TOPROL-XL) 50 mg 24 hr tablet; Take 1 tablet daily    Cervical cancer screening  -     Ambulatory Referral to Gynecology; Future    Need for hepatitis C screening test  -     Hepatitis C antibody; Future    Gastroesophageal reflux disease without esophagitis    Thyroid nodule    Overweight (BMI 25 0-29  9)    Dyslipidemia (high LDL; low HDL)    Other orders  -     fluconazole (DIFLUCAN) 150 mg tablet; TAKE 3 TAB(S) ORALLY EVERY 7 DAYS TWO WEEKS                Subjective:      Patient ID: Ijeoma Vazquez is a 46 y o  female  Chief Complaint   Patient presents with    Follow-up     6 month follow up   Last labs done 1/29/2022   Hypertension    GERD       46year old female is seen today for follow up of chronic conditions  No labs to review today     She has been compliant with her medication regimen  She will be undergoing oral surgery in September  She has no active complaints or concerns today  Hypertension  This is a chronic problem  The current episode started more than 1 year ago  The problem is unchanged  The problem is controlled  Pertinent negatives include no chest pain, headaches, palpitations or shortness of breath  Past treatments include diuretics, beta blockers and ACE inhibitors  The current treatment provides moderate improvement  There are no compliance problems  Identifiable causes of hypertension include a thyroid problem  Thyroid Problem  Presents for follow-up visit  Patient reports no cold intolerance, constipation, diaphoresis, diarrhea, fatigue, heat intolerance or palpitations  The symptoms have been stable  Her past medical history is significant for hyperlipidemia  Hyperlipidemia  This is a chronic problem  The current episode started more than 1 year ago  The problem is controlled  Recent lipid tests were reviewed and are normal  Pertinent negatives include no chest pain or shortness of breath  Current antihyperlipidemic treatment includes diet change  The following portions of the patient's history were reviewed and updated as appropriate: allergies, current medications, past family history, past medical history, past social history, past surgical history and problem list     Review of Systems   Constitutional: Negative for activity change, appetite change, chills, diaphoresis, fatigue and fever  HENT: Negative for congestion, postnasal drip, rhinorrhea, sinus pressure, sinus pain, sneezing and sore throat  Eyes: Negative for visual disturbance  Respiratory: Negative for apnea, cough, choking, chest tightness, shortness of breath and wheezing  Cardiovascular: Negative for chest pain, palpitations and leg swelling     Gastrointestinal: Negative for abdominal distention, abdominal pain, anal bleeding, blood in stool, constipation, diarrhea, nausea and vomiting  Endocrine: Negative for cold intolerance and heat intolerance  Genitourinary: Negative for difficulty urinating, dysuria and hematuria  Musculoskeletal: Negative  Skin: Negative  Neurological: Negative for dizziness, weakness, light-headedness, numbness and headaches  Hematological: Negative for adenopathy  Psychiatric/Behavioral: Negative for agitation, sleep disturbance and suicidal ideas  All other systems reviewed and are negative          Past Medical History:   Diagnosis Date    Benign neoplasm of parotid gland     left    Flexural eczema 4/14/2020    Hypertension     Urticaria 3/20/2019         Current Outpatient Medications:     ALPHA LIPOIC ACID-BIOTIN PO, Take 600 capsules by mouth in the morning, Disp: , Rfl:     Arginine 1000 MG TABS, Take 1,000 tablets by mouth in the morning, Disp: , Rfl:     Ascorbic Acid 100 MG CHEW, Chew 1,000 mg daily , Disp: , Rfl:     Cholecalciferol 75 MCG (3000 UT) TABS, Take 5,000 Units by mouth daily  , Disp: , Rfl:     co-enzyme Q-10 30 MG capsule, Take 300 mg by mouth daily, Disp: , Rfl:     fluconazole (DIFLUCAN) 150 mg tablet, TAKE 3 TAB(S) ORALLY EVERY 7 DAYS TWO WEEKS, Disp: , Rfl:     hydrochlorothiazide (HYDRODIURIL) 25 mg tablet, Take 1 tablet (25 mg total) by mouth daily, Disp: 90 tablet, Rfl: 1    lisinopril (ZESTRIL) 40 mg tablet, Take 1 tablet (40 mg total) by mouth daily, Disp: 90 tablet, Rfl: 1    metoprolol succinate (TOPROL-XL) 50 mg 24 hr tablet, Take 1 tablet daily, Disp: 90 tablet, Rfl: 1    NON FORMULARY, adrenevive, Disp: , Rfl:     Omega-3 Fatty Acids (fish oil) 1,000 mg, Take 6,000 mg by mouth 2 (two) times a day, Disp: , Rfl:     Red Yeast Rice 600 MG CAPS, Take 600 capsules by mouth 2 (two) times a day, Disp: , Rfl:     Allergies   Allergen Reactions    Amlodipine Edema and Swelling    Other      seasonal       Social History   Past Surgical History:   Procedure Laterality Date     SECTION      EAR SURGERY      SALIVARY GLAND SURGERY      TONSILECTOMY AND ADNOIDECTOMY       Family History   Problem Relation Age of Onset    Coronary artery disease Mother         age 52    Coronary artery disease Father         age 61    Breast cancer Sister         age 52   Mavis Cousin No Known Problems Daughter     No Known Problems Maternal Grandmother     No Known Problems Maternal Grandfather     No Known Problems Paternal Grandmother     No Known Problems Paternal Grandfather     No Known Problems Son     No Known Problems Maternal Aunt     No Known Problems Paternal Aunt     No Known Problems Paternal Aunt        Objective:  /80 (BP Location: Left arm, Patient Position: Sitting, Cuff Size: Adult)   Pulse (!) 53   Temp 98 9 °F (37 2 °C) (Tympanic)   Ht 5' 5" (1 651 m)   Wt 73 kg (161 lb)   SpO2 97%   BMI 26 79 kg/m²     No results found for this or any previous visit (from the past 1344 hour(s))  Physical Exam  Vitals and nursing note reviewed  Constitutional:       General: She is not in acute distress  Appearance: She is well-developed  She is not diaphoretic  HENT:      Head: Normocephalic and atraumatic  Eyes:      General:         Right eye: No discharge  Left eye: No discharge  Conjunctiva/sclera: Conjunctivae normal       Pupils: Pupils are equal, round, and reactive to light  Neck:      Thyroid: No thyromegaly  Vascular: No JVD  Cardiovascular:      Rate and Rhythm: Normal rate and regular rhythm  Heart sounds: Normal heart sounds  No murmur heard  No friction rub  No gallop  Pulmonary:      Effort: Pulmonary effort is normal  No respiratory distress  Breath sounds: Normal breath sounds  No wheezing or rales  Chest:      Chest wall: No tenderness  Abdominal:      General: There is no distension  Palpations: Abdomen is soft  Tenderness: There is no abdominal tenderness     Musculoskeletal:         General: No tenderness or deformity  Normal range of motion  Cervical back: Normal range of motion and neck supple  Lymphadenopathy:      Cervical: No cervical adenopathy  Skin:     General: Skin is warm and dry  Coloration: Skin is not pale  Findings: No erythema or rash  Neurological:      Mental Status: She is alert and oriented to person, place, and time  Cranial Nerves: No cranial nerve deficit  Coordination: Coordination normal    Psychiatric:         Behavior: Behavior normal          Thought Content:  Thought content normal          Judgment: Judgment normal

## 2022-08-01 NOTE — ASSESSMENT & PLAN NOTE
Controlled  Continue current antihypertensive regimen:  Hydrochlorothiazide 25 mg daily, lisinopril 40 mg daily, and metoprolol succinate 50 mg daily

## 2023-01-31 ENCOUNTER — RA CDI HCC (OUTPATIENT)
Dept: OTHER | Facility: HOSPITAL | Age: 53
End: 2023-01-31

## 2023-01-31 NOTE — PROGRESS NOTES
[facility] 75  coding opportunities       Chart reviewed, no opportunity found: CHART REVIEWED, NO OPPORTUNITY FOUND        Patients Insurance        Commercial Insurance: [employer]

## 2023-04-13 ENCOUNTER — TELEPHONE (OUTPATIENT)
Dept: INTERNAL MEDICINE CLINIC | Facility: OTHER | Age: 53
End: 2023-04-13

## 2023-04-21 ENCOUNTER — TELEPHONE (OUTPATIENT)
Dept: INTERNAL MEDICINE CLINIC | Facility: OTHER | Age: 53
End: 2023-04-21

## 2023-04-21 NOTE — TELEPHONE ENCOUNTER
LMOM for pt to call me at [facility] office    PT is due for a mammogram.  Please assist in scheduling.

## 2023-05-03 ENCOUNTER — TRANSITIONAL CARE MANAGEMENT (OUTPATIENT)
Dept: INTERNAL MEDICINE CLINIC | Facility: OTHER | Age: 53
End: 2023-05-03

## 2023-05-08 ENCOUNTER — OFFICE VISIT (OUTPATIENT)
Dept: INTERNAL MEDICINE CLINIC | Facility: OTHER | Age: 53
End: 2023-05-08

## 2023-05-08 VITALS
WEIGHT: 167.2 LBS | RESPIRATION RATE: 16 BRPM | TEMPERATURE: 98.3 F | OXYGEN SATURATION: 96 % | DIASTOLIC BLOOD PRESSURE: 78 MMHG | HEIGHT: 65 IN | SYSTOLIC BLOOD PRESSURE: 122 MMHG | HEART RATE: 47 BPM | BODY MASS INDEX: 27.86 KG/M2

## 2023-05-08 DIAGNOSIS — E04.1 THYROID NODULE: ICD-10-CM

## 2023-05-08 DIAGNOSIS — K81.0 ACUTE CHOLECYSTITIS: ICD-10-CM

## 2023-05-08 DIAGNOSIS — I10 BENIGN ESSENTIAL HYPERTENSION: ICD-10-CM

## 2023-05-08 DIAGNOSIS — I10 HYPERTENSION, UNSPECIFIED TYPE: ICD-10-CM

## 2023-05-08 DIAGNOSIS — E78.5 DYSLIPIDEMIA (HIGH LDL; LOW HDL): Primary | ICD-10-CM

## 2023-05-08 DIAGNOSIS — N30.00 ACUTE CYSTITIS WITHOUT HEMATURIA: ICD-10-CM

## 2023-05-08 DIAGNOSIS — Z11.59 ENCOUNTER FOR HEPATITIS C SCREENING TEST FOR LOW RISK PATIENT: ICD-10-CM

## 2023-05-08 RX ORDER — LISINOPRIL 40 MG/1
40 TABLET ORAL DAILY
Qty: 90 TABLET | Refills: 1 | Status: SHIPPED | OUTPATIENT
Start: 2023-05-08

## 2023-05-08 RX ORDER — OXYCODONE HYDROCHLORIDE 5 MG/1
5 TABLET ORAL EVERY 6 HOURS PRN
COMMUNITY
Start: 2023-04-30 | End: 2023-05-08

## 2023-05-08 RX ORDER — METOPROLOL SUCCINATE 50 MG/1
TABLET, EXTENDED RELEASE ORAL
Qty: 90 TABLET | Refills: 1 | Status: SHIPPED | OUTPATIENT
Start: 2023-05-08

## 2023-05-08 RX ORDER — HYDROCHLOROTHIAZIDE 25 MG/1
25 TABLET ORAL DAILY
Qty: 90 TABLET | Refills: 1 | Status: SHIPPED | OUTPATIENT
Start: 2023-05-08

## 2023-05-08 NOTE — PROGRESS NOTES
Assessment & Plan     1  Dyslipidemia (high LDL; low HDL)  -     CBC; Future; Expected date: 11/08/2023  -     Comprehensive metabolic panel; Future; Expected date: 11/08/2023  -     Lipid panel; Future; Expected date: 11/08/2023    2  Benign essential hypertension  Assessment & Plan:  Controlled  Continue hydrochlorothiazide 25 mg daily, lisinopril 40 mg daily, and metoprolol succinate 50 mg daily  Orders:  -     hydrochlorothiazide (HYDRODIURIL) 25 mg tablet; Take 1 tablet (25 mg total) by mouth daily  -     lisinopril (ZESTRIL) 40 mg tablet; Take 1 tablet (40 mg total) by mouth daily  -     CBC; Future; Expected date: 11/08/2023  -     Comprehensive metabolic panel; Future; Expected date: 11/08/2023  -     Lipid panel; Future; Expected date: 11/08/2023    3  Hypertension, unspecified type  -     metoprolol succinate (TOPROL-XL) 50 mg 24 hr tablet; Take 1 tablet daily    4  Encounter for hepatitis C screening test for low risk patient  -     Hepatitis C antibody; Future; Expected date: 11/08/2023    5  Thyroid nodule  -     TSH, 3rd generation with Free T4 reflex; Future; Expected date: 11/08/2023    6  Acute cholecystitis  Assessment & Plan:  Status post laparoscopic cholecystectomy  She has follow-up scheduled with general surgery  7  Acute cystitis without hematuria  Assessment & Plan:  Resolved, complete antibiotic therapy  BMI Counseling: Body mass index is 27 82 kg/m²  The BMI is above normal  Nutrition recommendations include decreasing portion sizes, encouraging healthy choices of fruits and vegetables, decreasing fast food intake, consuming healthier snacks, limiting drinks that contain sugar, moderation in carbohydrate intake, increasing intake of lean protein, reducing intake of saturated and trans fat and reducing intake of cholesterol  Exercise recommendations include moderate physical activity 150 minutes/week and exercising 3-5 times per week  No pharmacotherapy was ordered   Patient referred to PCP  Rationale for BMI follow-up plan is due to patient being overweight or obese  Depression Screening and Follow-up Plan: Patient was screened for depression during today's encounter  They screened negative with a PHQ-2 score of 0  Subjective     Transitional Care Management Review:   Eleni Acevedo is a 45 y o  female here for TCM follow up  During the TCM phone call patient stated:  TCM Call     Date and time call was made  5/3/2023  4:27 PM    Hospital care reviewed  Records reviewed    Patient was hospitialized at  [facility]    Date of Admission  04/29/23    Date of discharge  05/01/23    Diagnosis  cholecystitis, acute cystitis without hematuria    Disposition  Home    Current Symptoms  Fatigue; Back pain - right side; Back pain - left side; Cough; Loose Stools      TCM Call     Post hospital issues  None    Did you obtain your prescribed medications  Yes    Do you need help managing your prescriptions or medications  No    Is transportation to your appointment needed  No    I have advised the patient to call PCP with any new or worsening symptoms  Wilver Randall ANTONIO        45year old female is seen today for TCM to which she was hospitalized at [facility] from 4/29-5/1/2023  Hospitalization and discharge summary reviewed  She presented to [address] [place] [address], [address] [place] [address] for RUQ abdominal pain, diagnosed with cholelithiasis with acute cholecystitis via CT scan  She underwent laparoscopic cholecystectomy on 4/30/2023  She tolerated the procedure well without complications  She has a follow up appointment with general surgery next Monday  She was found to have a UTI as well upon ER evaluation  She was started on antibiotics and discharged on Augmentin to which she has completed  Review of Systems   Constitutional: Negative for activity change, appetite change, chills, diaphoresis, fatigue and fever     HENT: Negative for congestion, postnasal drip, rhinorrhea, sinus pressure, sinus pain, "sneezing and sore throat  Eyes: Negative for visual disturbance  Respiratory: Negative for apnea, cough, choking, chest tightness, shortness of breath and wheezing  Cardiovascular: Negative for chest pain, palpitations and leg swelling  Gastrointestinal: Negative for abdominal distention, abdominal pain, anal bleeding, blood in stool, constipation, diarrhea, nausea and vomiting  Endocrine: Negative for cold intolerance and heat intolerance  Genitourinary: Negative for difficulty urinating, dysuria and hematuria  Musculoskeletal: Negative  Skin: Negative  Neurological: Negative for dizziness, weakness, light-headedness, numbness and headaches  Hematological: Negative for adenopathy  Psychiatric/Behavioral: Negative for agitation, sleep disturbance and suicidal ideas  All other systems reviewed and are negative  Objective     /78 (BP Location: Left arm, Patient Position: Sitting, Cuff Size: Standard)   Pulse (!) 47   Temp 98 3 °F (36 8 °C) (Temporal)   Resp 16   Ht 5' 5\" (1 651 m)   Wt 75 8 kg (167 lb 3 2 oz)   SpO2 96%   BMI 27 82 kg/m²      Physical Exam  Vitals and nursing note reviewed  Constitutional:       General: She is not in acute distress  Appearance: Normal appearance  She is normal weight  She is not ill-appearing  HENT:      Head: Normocephalic and atraumatic  Right Ear: Tympanic membrane, ear canal and external ear normal  There is no impacted cerumen  Left Ear: Tympanic membrane, ear canal and external ear normal  There is no impacted cerumen  Nose: Nose normal  No congestion or rhinorrhea  Mouth/Throat:      Mouth: Mucous membranes are moist       Pharynx: Oropharynx is clear  No oropharyngeal exudate or posterior oropharyngeal erythema  Eyes:      General: No scleral icterus  Right eye: No discharge  Left eye: No discharge  Extraocular Movements: Extraocular movements intact        Conjunctiva/sclera: " Conjunctivae normal       Pupils: Pupils are equal, round, and reactive to light  Neck:      Vascular: No carotid bruit  Cardiovascular:      Rate and Rhythm: Normal rate and regular rhythm  Pulses: Normal pulses  Heart sounds: Normal heart sounds  No murmur heard  No friction rub  No gallop  Pulmonary:      Effort: Pulmonary effort is normal  No respiratory distress  Breath sounds: Normal breath sounds  No stridor  No wheezing, rhonchi or rales  Chest:      Chest wall: No tenderness  Abdominal:      General: Abdomen is flat  Bowel sounds are normal  There is no distension  Palpations: Abdomen is soft  There is no mass  Tenderness: There is no abdominal tenderness  Hernia: No hernia is present  Musculoskeletal:         General: No swelling, tenderness, deformity or signs of injury  Normal range of motion  Cervical back: Normal range of motion and neck supple  No rigidity  No muscular tenderness  Right lower leg: No edema  Left lower leg: No edema  Lymphadenopathy:      Cervical: No cervical adenopathy  Skin:     General: Skin is warm and dry  Capillary Refill: Capillary refill takes less than 2 seconds  Coloration: Skin is not jaundiced or pale  Findings: No bruising, erythema, lesion or rash  Neurological:      General: No focal deficit present  Mental Status: She is alert and oriented to person, place, and time  Mental status is at baseline  Cranial Nerves: No cranial nerve deficit  Sensory: No sensory deficit  Motor: No weakness  Coordination: Coordination normal       Gait: Gait normal       Deep Tendon Reflexes: Reflexes normal    Psychiatric:         Mood and Affect: Mood normal          Behavior: Behavior normal          Thought Content:  Thought content normal          Judgment: Judgment normal        Medications have been reviewed by provider in current encounter    Anna Bro MD

## 2023-07-07 PROBLEM — N30.00 ACUTE CYSTITIS WITHOUT HEMATURIA: Status: RESOLVED | Noted: 2023-04-30 | Resolved: 2023-07-07

## 2023-12-06 ENCOUNTER — OFFICE VISIT (OUTPATIENT)
Dept: FAMILY MEDICINE CLINIC | Facility: HOME HEALTHCARE | Age: 53
End: 2023-12-06
Payer: COMMERCIAL

## 2023-12-06 ENCOUNTER — TELEPHONE (OUTPATIENT)
Dept: ADMINISTRATIVE | Facility: OTHER | Age: 53
End: 2023-12-06

## 2023-12-06 VITALS
DIASTOLIC BLOOD PRESSURE: 72 MMHG | BODY MASS INDEX: 27.86 KG/M2 | SYSTOLIC BLOOD PRESSURE: 136 MMHG | TEMPERATURE: 98.6 F | WEIGHT: 167.4 LBS | OXYGEN SATURATION: 97 % | HEART RATE: 86 BPM | RESPIRATION RATE: 18 BRPM

## 2023-12-06 DIAGNOSIS — Z00.00 ANNUAL PHYSICAL EXAM: Primary | ICD-10-CM

## 2023-12-06 DIAGNOSIS — Z12.31 ENCOUNTER FOR SCREENING MAMMOGRAM FOR BREAST CANCER: ICD-10-CM

## 2023-12-06 DIAGNOSIS — I10 BENIGN ESSENTIAL HYPERTENSION: ICD-10-CM

## 2023-12-06 DIAGNOSIS — Z91.89 NEED FOR DENTAL CARE: ICD-10-CM

## 2023-12-06 DIAGNOSIS — Z12.4 SCREENING FOR CERVICAL CANCER: ICD-10-CM

## 2023-12-06 PROCEDURE — 99386 PREV VISIT NEW AGE 40-64: CPT | Performed by: PHYSICIAN ASSISTANT

## 2023-12-06 RX ORDER — HYDROCHLOROTHIAZIDE 25 MG/1
25 TABLET ORAL DAILY
Qty: 90 TABLET | Refills: 1 | Status: SHIPPED | OUTPATIENT
Start: 2023-12-06

## 2023-12-06 RX ORDER — METOPROLOL SUCCINATE 50 MG/1
TABLET, EXTENDED RELEASE ORAL
Qty: 90 TABLET | Refills: 1 | Status: SHIPPED | OUTPATIENT
Start: 2023-12-06

## 2023-12-06 RX ORDER — LISINOPRIL 40 MG/1
40 TABLET ORAL DAILY
Qty: 90 TABLET | Refills: 1 | Status: SHIPPED | OUTPATIENT
Start: 2023-12-06

## 2023-12-06 NOTE — LETTER
Procedure Request Form: Cervical Cancer Screening      Date Requested: 23  Patient: Noble Benoit  Patient : 1970   Referring Provider: Anjelica Young PA-C        Date of Procedure ______________________________       The above patient has informed us that they have completed their   most recent Cervical Cancer Screening at your facility. Please complete   this form and attach all corresponding procedure reports/results. Comments __________________________________________________________  ____________________________________________________________________  ____________________________________________________________________  ____________________________________________________________________    Facility Completing Procedure _________________________________________    Form Completed By (print name) _______________________________________      Signature __________________________________________________________      These reports are needed for  compliance. Please fax this completed form and a copy of the procedure report to our office located at [address] [place] [address] as soon as possible to Fax [phone] janice Mccray: Phone [phone]    We thank you for your assistance in treating our mutual patient.

## 2023-12-06 NOTE — TELEPHONE ENCOUNTER
Upon review of the In Basket request and the patient's chart, initial outreach has been made via fax to facility. Please see Contacts section for details.      Thank you  Cody Oliveros MA

## 2023-12-06 NOTE — PROGRESS NOTES
[address]    NAME: Ivette Mendoza  AGE: 52 y.o. SEX: female  : 1970     DATE: 2023     Assessment and Plan:     Problem List Items Addressed This Visit     Benign essential hypertension    Relevant Medications    hydrochlorothiazide (HYDRODIURIL) 25 mg tablet    lisinopril (ZESTRIL) 40 mg tablet    metoprolol succinate (TOPROL-XL) 50 mg 24 hr tablet   Other Visit Diagnoses     Annual physical exam    -  Primary    Encounter for screening mammogram for breast cancer        Relevant Orders    Mammo screening bilateral w 3d & cad    Screening for cervical cancer        Relevant Orders    Ambulatory Referral to Obstetrics / Gynecology    Need for dental care        Relevant Orders    Ambulatory referral to Dentistry          Immunizations and preventive care screenings were discussed with patient today. Appropriate education was printed on patient's after visit summary. Counseling:  Alcohol/drug use: discussed moderation in alcohol intake, the recommendations for healthy alcohol use, and avoidance of illicit drug use. Dental Health: discussed importance of regular tooth brushing, flossing, and dental visits. Injury prevention: discussed safety/seat belts, safety helmets, smoke detectors, carbon dioxide detectors, and smoking near bedding or upholstery. Sexual health: discussed sexually transmitted diseases, partner selection, use of condoms, avoidance of unintended pregnancy, and contraceptive alternatives. Exercise: the importance of regular exercise/physical activity was discussed. Recommend exercise 3-5 times per week for at least 30 minutes. Return in about 6 months (around 2024) for Next scheduled follow up.      Chief Complaint:     Chief Complaint   Patient presents with   • Establish Care      History of Present Illness:     Adult Annual Physical   Patient here for a comprehensive physical exam. The patient reports no problems. Diet and Physical Activity  Diet/Nutrition: well balanced diet, limited junk food, and limited fruits/vegetables. Exercise: no formal exercise. Depression Screening  PHQ-2/9 Depression Screening         General Health  Sleep: sleeps well and gets 7-8 hours of sleep on average. Hearing: normal - bilateral.  Vision: no vision problems. Dental: no dental visits for >1 year. /GYN Health  Follows with gynecology? no   Patient is: postmenopausal  Last menstrual period: 2 years  Contraceptive method: menopause. Advanced Care Planning  Do you have an advanced directive? no  Do you have a durable medical power of ? no     Review of Systems:     Review of Systems   Constitutional:  Negative for chills, diaphoresis and fever. Eyes:  Negative for visual disturbance. Respiratory:  Negative for cough, chest tightness, shortness of breath and wheezing. Cardiovascular:  Negative for chest pain, palpitations and leg swelling. Gastrointestinal:  Negative for abdominal pain, blood in stool, constipation, diarrhea, nausea and vomiting. Genitourinary:  Negative for dysuria and hematuria. Skin:  Negative for rash and wound. Neurological:  Negative for dizziness, syncope, weakness, light-headedness and headaches. Psychiatric/Behavioral:  Negative for dysphoric mood, self-injury, sleep disturbance and suicidal ideas. The patient is not nervous/anxious. All other systems reviewed and are negative.      Past Medical History:     Past Medical History:   Diagnosis Date   • Benign neoplasm of parotid gland     left   • Flexural eczema 2020   • Hypertension    • Urticaria 3/20/2019      Past Surgical History:     Past Surgical History:   Procedure Laterality Date   •  SECTION     • CHOLECYSTECTOMY  2023   • EAR SURGERY     • SALIVARY GLAND SURGERY     • TONSILECTOMY AND ADNOIDECTOMY        Social History:     Social History Socioeconomic History   • Marital status:      Spouse name: None   • Number of children: None   • Years of education: None   • Highest education level: None   Occupational History   • None   Tobacco Use   • Smoking status: Never   • Smokeless tobacco: Never   Vaping Use   • Vaping Use: Never used   Substance and Sexual Activity   • Alcohol use: Yes     Comment: social   • Drug use: No   • Sexual activity: Yes   Other Topics Concern   • None   Social History Narrative   • None     Social Determinants of Health     Financial Resource Strain: Not on file   Food Insecurity: Not on file   Transportation Needs: Not on file   Physical Activity: Not on file   Stress: Not on file   Social Connections: Not on file   Intimate Partner Violence: Not on file   Housing Stability: Not on file      Family History:     Family History   Problem Relation Age of Onset   • Coronary artery disease Mother         age 52   • Coronary artery disease Father         age 61   • Breast cancer Sister         age 52   • No Known Problems Daughter    • No Known Problems Maternal Grandmother    • No Known Problems Maternal Grandfather    • No Known Problems Paternal Grandmother    • No Known Problems Paternal Grandfather    • No Known Problems Son    • No Known Problems Maternal Aunt    • No Known Problems Paternal Aunt    • No Known Problems Paternal Aunt       Current Medications:     Current Outpatient Medications   Medication Sig Dispense Refill   • ALPHA LIPOIC ACID-BIOTIN PO Take 600 capsules by mouth in the morning     • Cholecalciferol 75 MCG (3000 UT) TABS Take 5,000 Units by mouth daily       • co-enzyme Q-10 30 MG capsule Take 300 mg by mouth daily     • fluconazole (DIFLUCAN) 150 mg tablet Take 150 mg by mouth if needed     • hydrochlorothiazide (HYDRODIURIL) 25 mg tablet Take 1 tablet (25 mg total) by mouth daily 90 tablet 1   • lisinopril (ZESTRIL) 40 mg tablet Take 1 tablet (40 mg total) by mouth daily 90 tablet 1   • metoprolol succinate (TOPROL-XL) 50 mg 24 hr tablet Take 1 tablet daily 90 tablet 1   • Omega-3 Fatty Acids (fish oil) 1,000 mg Take 6,000 mg by mouth 2 (two) times a day     • Red Yeast Rice 600 MG CAPS Take 600 capsules by mouth 2 (two) times a day     • Ascorbic Acid 100 MG CHEW Chew 1,000 mg daily        No current facility-administered medications for this visit. Allergies: Allergies   Allergen Reactions   • Amlodipine Edema and Swelling   • Other      seasonal      Physical Exam:     /72   Pulse 86   Temp 98.6 °F (37 °C)   Resp 18   Wt 75.9 kg (167 lb 6.4 oz)   SpO2 97%   BMI 27.86 kg/m²     Physical Exam  Vitals and nursing note reviewed. Constitutional:       General: She is not in acute distress. Appearance: Normal appearance. HENT:      Head: Normocephalic and atraumatic. Right Ear: Tympanic membrane, ear canal and external ear normal.      Left Ear: Tympanic membrane, ear canal and external ear normal.      Nose: Nose normal.      Mouth/Throat:      Mouth: Mucous membranes are moist.      Pharynx: Oropharynx is clear. No oropharyngeal exudate. Eyes:      Extraocular Movements: Extraocular movements intact. Conjunctiva/sclera: Conjunctivae normal.      Pupils: Pupils are equal, round, and reactive to light. Cardiovascular:      Rate and Rhythm: Normal rate and regular rhythm. Heart sounds: Normal heart sounds. No murmur heard. Pulmonary:      Effort: Pulmonary effort is normal. No respiratory distress. Breath sounds: Normal breath sounds. No wheezing. Musculoskeletal:         General: Normal range of motion. Cervical back: Normal range of motion and neck supple. Right lower leg: No edema. Left lower leg: No edema. Lymphadenopathy:      Cervical: No cervical adenopathy. Skin:     General: Skin is warm and dry. Neurological:      General: No focal deficit present.       Mental Status: She is alert and oriented to person, place, and time.      Cranial Nerves: No cranial nerve deficit. Motor: No weakness.    Psychiatric:         Mood and Affect: Mood normal.         Behavior: Behavior normal.          Anjelica Young, [address]

## 2023-12-06 NOTE — TELEPHONE ENCOUNTER
----- Message from Demarcus Bran PA-C sent at 12/6/2023 12:07 PM EST -----  Regarding: Care Gap Request  12/06/23 12:07 PM    Hello, our patient Kailyn Pritchard has had Pap Smear (HPV) aka Cervical Cancer Screening completed/performed. Please assist in updating the patient chart by making an External outreach to [address] [place] [address] located in  [place], [place]. The date of service is within the past 5 years.     Thank you,  Demarcus Bran PA-C  [address]

## 2023-12-08 NOTE — TELEPHONE ENCOUNTER
Upon review of the In Basket request we were able to locate, review, and update the patient chart as requested for Pap Smear (HPV) aka Cervical Cancer Screening. Any additional questions or concerns should be emailed to the Practice Liaisons via the appropriate education email address, please do not reply via In Basket.     Thank you  Milan Dunn MA

## 2024-09-30 ENCOUNTER — PATIENT MESSAGE (OUTPATIENT)
Dept: FAMILY MEDICINE CLINIC | Facility: HOME HEALTHCARE | Age: 54
End: 2024-09-30

## 2024-10-20 ENCOUNTER — OFFICE VISIT (OUTPATIENT)
Dept: URGENT CARE | Facility: MEDICAL CENTER | Age: 54
End: 2024-10-20
Payer: COMMERCIAL

## 2024-10-20 VITALS
TEMPERATURE: 98.4 F | WEIGHT: 166 LBS | OXYGEN SATURATION: 97 % | HEART RATE: 68 BPM | RESPIRATION RATE: 16 BRPM | BODY MASS INDEX: 27.62 KG/M2 | SYSTOLIC BLOOD PRESSURE: 134 MMHG | DIASTOLIC BLOOD PRESSURE: 72 MMHG

## 2024-10-20 DIAGNOSIS — S90.561A INFECTED INSECT BITE OF RIGHT ANKLE, INITIAL ENCOUNTER: Primary | ICD-10-CM

## 2024-10-20 DIAGNOSIS — L08.9 INFECTED INSECT BITE OF RIGHT ANKLE, INITIAL ENCOUNTER: Primary | ICD-10-CM
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
## 2024-10-20 PROCEDURE — G0381 LEV 2 HOSP TYPE B ED VISIT: HCPCS | Performed by: PHYSICIAN ASSISTANT

## 2024-10-20 RX ORDER — CEPHALEXIN 500 MG/1
500 CAPSULE ORAL EVERY 8 HOURS SCHEDULED
Qty: 21 CAPSULE | Refills: 0 | Status: SHIPPED | OUTPATIENT
Start: 2024-10-20 | End: 2024-10-27

## 2024-10-20 NOTE — PROGRESS NOTES
[facility]        NAME: Chkiis Marquez is a 53 y.o. female  : 1970    MRN: [id number]  DATE: 2024  TIME: 1:37 PM    Assessment and Plan   Infected insect bite of right ankle, initial encounter [S90.561A, L08.9, W57.XXXA]  1. Infected insect bite of right ankle, initial encounter  cephalexin (KEFLEX) 500 mg capsule            Patient Instructions     Start antibiotic as prescribed  If symptoms worsen or you develop a fever go to the ER for further evaluation  If symptoms fail to improve follow up with PCP    Follow up with PCP in 3-5 days.  Proceed to  ER if symptoms worsen.    If tests have been performed at [facility], our office will contact you with results if changes need to be made to the care plan discussed with you at the visit.  [facility]    Chief Complaint     Chief Complaint   Patient presents with    Ankle Pain     Pt noticed insect bite 5 days agoon outer aspect of right ankle, Very itchy ,was scratching it and now is infected.         History of Present Illness       Patient states she had what she thought was a flea bite on her right ankle.  She started scratching at it and it became irritated.  The area is now reddened. No drainage, fever or chills.        Review of Systems   Review of Systems   Constitutional:  Negative for chills and fever.   Musculoskeletal:  Negative for arthralgias.   Skin:  Positive for wound.   Hematological:  Negative for adenopathy.         Current Medications       Current Outpatient Medications:     ALPHA LIPOIC ACID-BIOTIN PO, Take 600 capsules by mouth in the morning, Disp: , Rfl:     Ascorbic Acid 100 MG CHEW, Chew 1,000 mg daily , Disp: , Rfl:     cephalexin (KEFLEX) 500 mg capsule, Take 1 capsule (500 mg total) by mouth every 8 (eight) hours for 7 days, Disp: 21 capsule, Rfl: 0    Cholecalciferol 75 MCG (3000 UT) TABS, Take 5,000 Units by mouth daily  , Disp: , Rfl:     co-enzyme Q-10 30 MG  capsule, Take 300 mg by mouth daily, Disp: , Rfl:     hydrochlorothiazide (HYDRODIURIL) 25 mg tablet, Take 1 tablet (25 mg total) by mouth daily, Disp: 90 tablet, Rfl: 1    lisinopril (ZESTRIL) 40 mg tablet, Take 1 tablet (40 mg total) by mouth daily, Disp: 90 tablet, Rfl: 1    metoprolol succinate (TOPROL-XL) 50 mg 24 hr tablet, Take 1 tablet daily, Disp: 90 tablet, Rfl: 1    Omega-3 Fatty Acids (fish oil) 1,000 mg, Take 6,000 mg by mouth 2 (two) times a day, Disp: , Rfl:     Red Yeast Rice 600 MG CAPS, Take 600 capsules by mouth 2 (two) times a day, Disp: , Rfl:     fluconazole (DIFLUCAN) 150 mg tablet, Take 150 mg by mouth if needed (Patient not taking: Reported on 10/20/2024), Disp: , Rfl:     Current Allergies     Allergies as of 10/20/2024 - Reviewed 10/20/2024   Allergen Reaction Noted    Amlodipine Edema and Swelling 2019    Other  2016            The following portions of the patient's history were reviewed and updated as appropriate: allergies, current medications, past family history, past medical history, past social history, past surgical history and problem list.     Past Medical History:   Diagnosis Date    Benign neoplasm of parotid gland     left    Flexural eczema 2020    Hypertension     Urticaria 3/20/2019       Past Surgical History:   Procedure Laterality Date     SECTION      CHOLECYSTECTOMY  2023    EAR SURGERY      SALIVARY GLAND SURGERY      TONSILECTOMY AND ADNOIDECTOMY         Family History   Problem Relation Age of Onset    Coronary artery disease Mother         age 47    Coronary artery disease Father         age 59    Breast cancer Sister         age 47    No Known Problems Daughter     No Known Problems Maternal Grandmother     No Known Problems Maternal Grandfather     No Known Problems Paternal Grandmother     No Known Problems Paternal Grandfather     No Known Problems Son     No Known Problems Maternal Aunt     No Known Problems Paternal Aunt     No  Known Problems Paternal Aunt          Medications have been verified.        Objective   /72 (BP Location: Right arm, Patient Position: Sitting, Cuff Size: Standard)   Pulse 68   Temp 98.4 °F (36.9 °C) (Temporal)   Resp 16   Wt 75.3 kg (166 lb)   LMP 09/21/2020 (Approximate)   SpO2 97%   BMI 27.62 kg/m²   Patient's last menstrual period was 09/21/2020 (approximate).       Physical Exam     Physical Exam  Vitals and nursing note reviewed.   Constitutional:       Appearance: Normal appearance.   HENT:      Head: Normocephalic and atraumatic.   Cardiovascular:      Rate and Rhythm: Normal rate.   Pulmonary:      Effort: Pulmonary effort is normal.   Skin:     General: Skin is warm.      Comments: 1-1/2 cm area of right lateral ankle with erythema surrounding wound.  No drainage.   Neurological:      Mental Status: She is alert.

## 2024-10-20 NOTE — PATIENT INSTRUCTIONS
Start antibiotic as prescribed  If symptoms worsen or you develop a fever go to the ER for further evaluation  If symptoms fail to improve follow up with PCP

## 2024-11-16 ENCOUNTER — OFFICE VISIT (OUTPATIENT)
Dept: URGENT CARE | Facility: MEDICAL CENTER | Age: 54
End: 2024-11-16
Payer: COMMERCIAL

## 2024-11-16 VITALS
TEMPERATURE: 97.1 F | RESPIRATION RATE: 18 BRPM | OXYGEN SATURATION: 98 % | HEART RATE: 55 BPM | DIASTOLIC BLOOD PRESSURE: 90 MMHG | SYSTOLIC BLOOD PRESSURE: 140 MMHG

## 2024-11-16 DIAGNOSIS — J02.9 ACUTE VIRAL PHARYNGITIS: Primary | ICD-10-CM

## 2024-11-16 LAB — S PYO AG THROAT QL: POSITIVE

## 2024-11-16 PROCEDURE — 87880 STREP A ASSAY W/OPTIC: CPT

## 2024-11-16 PROCEDURE — G0381 LEV 2 HOSP TYPE B ED VISIT: HCPCS

## 2024-11-16 RX ORDER — AMOXICILLIN 500 MG/1
500 CAPSULE ORAL EVERY 12 HOURS SCHEDULED
Qty: 20 CAPSULE | Refills: 0 | Status: SHIPPED | OUTPATIENT
Start: 2024-11-16 | End: 2024-11-26

## 2024-11-16 NOTE — PROGRESS NOTES
"  [facility]        NAME: Chikis Marquez is a 54 y.o. female  : 1970    MRN: [id number]  DATE: 2024  TIME: 12:38 PM    Assessment and Plan   Acute viral pharyngitis [J02.9]  1. strep pharyngitis  POCT rapid ANTIGEN strepA    amoxicillin (AMOXIL) 500 mg capsule            Patient Instructions   Your have been diagnosed with strep pharyngitis. This is an inflammation of the mucous membranes and structures of the throat and tonsils typically caused by an infection. It is typical to see high fevers, abdominal pain, sore throat, spots called petechiae on the inside of the mouth, as well as a rough \"sandpaper like\" rash with this infection.     I have sent an oral antibiotic to your pharmacy that your child needs to take and complete the full course, even if you child is feeling better. Please change out your child's toothbrush.      Typically you will see your child acting more like themselves in a day or two, however please avoid close contact with other children for the next 24 hours. Please do not allow your child to share drinks, as this infection spreads easily by respiratory droplets/secretions/saliva. It would be beneficial to change your child's toothbrush in 24 hours as well, as the bacteria may be on the bristles.          Follow up with PCP in 3-5 days.  Proceed to  ER if symptoms worsen.    If tests have been performed at [facility], our office will contact you with results if changes need to be made to the care plan discussed with you at the visit.  [facility].    Chief Complaint     Chief Complaint   Patient presents with    Sore Throat     Sore throat and sinus congestion.  Taking mucinex and aleve.  Son just starting chemo so is afraid if she is contagious.  Symptoms started Thursday.  Does have history of Strep.         History of Present Illness       Sore throat and sinus congestion.  Taking mucinex and aleve.  Son just starting chemo " so is afraid if she is contagious.  Symptoms started Thursday.  Does have history of Strep.  She says that this feels like strep and she says that she had strep a few years ago and she also states that she is not having any fevers or chills/ headaches/ stomach pain  She states that she is having sinus pressure and she says that Muxcinex is not helping. She denies SOB, wheezing, and rib retractions.  She says that she is staying well hydrated and eating well. She denies diarrhea.          Review of Systems   Review of Systems   Constitutional:  Negative for chills and fever.   HENT:  Positive for sore throat. Negative for ear pain.    Eyes:  Negative for pain and visual disturbance.   Respiratory:  Negative for cough and shortness of breath.    Cardiovascular:  Negative for chest pain and palpitations.   Gastrointestinal:  Negative for abdominal pain and vomiting.   Genitourinary:  Negative for dysuria and hematuria.   Musculoskeletal:  Negative for arthralgias and back pain.   Skin:  Negative for color change and rash.   Neurological:  Negative for seizures and syncope.   All other systems reviewed and are negative.        Current Medications       Current Outpatient Medications:     ALPHA LIPOIC ACID-BIOTIN PO, Take 600 capsules by mouth in the morning, Disp: , Rfl:     amoxicillin (AMOXIL) 500 mg capsule, Take 1 capsule (500 mg total) by mouth every 12 (twelve) hours for 10 days, Disp: 20 capsule, Rfl: 0    Ascorbic Acid 100 MG CHEW, Chew 1,000 mg daily , Disp: , Rfl:     Cholecalciferol 75 MCG (3000 UT) TABS, Take 5,000 Units by mouth daily  , Disp: , Rfl:     co-enzyme Q-10 30 MG capsule, Take 300 mg by mouth daily, Disp: , Rfl:     hydrochlorothiazide (HYDRODIURIL) 25 mg tablet, Take 1 tablet (25 mg total) by mouth daily, Disp: 90 tablet, Rfl: 1    lisinopril (ZESTRIL) 40 mg tablet, Take 1 tablet (40 mg total) by mouth daily, Disp: 90 tablet, Rfl: 1    metoprolol succinate (TOPROL-XL) 50 mg 24 hr tablet, Take 1  tablet daily, Disp: 90 tablet, Rfl: 1    Omega-3 Fatty Acids (fish oil) 1,000 mg, Take 6,000 mg by mouth 2 (two) times a day, Disp: , Rfl:     Red Yeast Rice 600 MG CAPS, Take 600 capsules by mouth 2 (two) times a day, Disp: , Rfl:     fluconazole (DIFLUCAN) 150 mg tablet, Take 150 mg by mouth if needed (Patient not taking: Reported on 10/20/2024), Disp: , Rfl:     Current Allergies     Allergies as of 2024 - Reviewed 2024   Allergen Reaction Noted    Amlodipine Edema and Swelling 2019    Other  2016            The following portions of the patient's history were reviewed and updated as appropriate: allergies, current medications, past family history, past medical history, past social history, past surgical history and problem list.     Past Medical History:   Diagnosis Date    Benign neoplasm of parotid gland     left    Flexural eczema 2020    Hypertension     Urticaria 3/20/2019       Past Surgical History:   Procedure Laterality Date     SECTION      CHOLECYSTECTOMY  2023    EAR SURGERY      SALIVARY GLAND SURGERY      TONSILECTOMY AND ADNOIDECTOMY         Family History   Problem Relation Age of Onset    Coronary artery disease Mother         age 47    Coronary artery disease Father         age 59    Breast cancer Sister         age 47    No Known Problems Daughter     No Known Problems Maternal Grandmother     No Known Problems Maternal Grandfather     No Known Problems Paternal Grandmother     No Known Problems Paternal Grandfather     No Known Problems Son     No Known Problems Maternal Aunt     No Known Problems Paternal Aunt     No Known Problems Paternal Aunt          Medications have been verified.        Objective   /90 (BP Location: Left arm)   Pulse 55   Temp (!) 97.1 °F (36.2 °C) (Skin)   Resp 18   LMP 2020 (Approximate)   SpO2 98%   Patient's last menstrual period was 2020 (approximate).       Physical Exam     Physical  Exam  Constitutional:       General: She is not in acute distress.     Appearance: Normal appearance. She is not ill-appearing or toxic-appearing.   HENT:      Head: Normocephalic and atraumatic.      Right Ear: Tympanic membrane, ear canal and external ear normal.      Left Ear: Tympanic membrane, ear canal and external ear normal.      Nose: Congestion present. No rhinorrhea.      Mouth/Throat:      Mouth: Mucous membranes are moist.      Pharynx: Oropharyngeal exudate and posterior oropharyngeal erythema present.   Eyes:      Conjunctiva/sclera: Conjunctivae normal.      Pupils: Pupils are equal, round, and reactive to light.   Cardiovascular:      Rate and Rhythm: Normal rate and regular rhythm.      Pulses: Normal pulses.      Heart sounds: Normal heart sounds. No murmur heard.     No friction rub. No gallop.   Pulmonary:      Effort: Pulmonary effort is normal. No respiratory distress.      Breath sounds: Normal breath sounds. No stridor. No wheezing, rhonchi or rales.   Chest:      Chest wall: No tenderness.   Abdominal:      General: Abdomen is flat. Bowel sounds are normal. There is no distension.      Palpations: Abdomen is soft.      Tenderness: There is no abdominal tenderness. There is no guarding.   Musculoskeletal:         General: No swelling, tenderness or deformity. Normal range of motion.      Cervical back: Normal range of motion and neck supple.   Skin:     General: Skin is warm and dry.      Coloration: Skin is not jaundiced or pale.   Neurological:      General: No focal deficit present.      Mental Status: She is alert and oriented to person, place, and time. Mental status is at baseline.      Cranial Nerves: No cranial nerve deficit.      Sensory: No sensory deficit.      Motor: No weakness.   Psychiatric:         Mood and Affect: Mood normal.         Behavior: Behavior normal.         Thought Content: Thought content normal.         Judgment: Judgment normal.

## 2024-11-16 NOTE — PATIENT INSTRUCTIONS
Patient Education     Strep throat in adults   The Basics   Written by the doctors and editors at [facility]   What is strep throat? -- Strep throat is an infection caused by a certain type of bacteria. It leads to a sore throat.  Most sore throats are caused by a virus, and are not strep throat. Only about 1 in 10 adults who seek medical care for sore throat have strep throat. But if you do have strep throat, you will need treatment with antibiotics.  How can I tell if I have strep throat? -- It is hard to tell the difference between strep throat and a sore throat caused by a virus. But there are some clues that you can look for.  People who have strep throat often have:   Severe throat pain   Fever (temperature higher than 100.4°F or 38°C)   Swollen glands in the neck  You might also be able to see redness on the roof of your mouth, or white patches in the back of your throat (figure 1).  People who have strep throat usually do not have a cough, runny nose, or itchy or red eyes. These symptoms are more common when the sore throat is caused by a virus.  Is there a test for strep throat? -- Yes. If you think that you might have strep throat, a doctor or nurse can easily check for it. They can run a swab along the back of your throat, and test it for the bacteria that cause strep throat.  Do I need antibiotics? -- Yes. If a test shows that you have strep throat, then you need antibiotics. Antibiotics can help reduce your symptoms and keep the infection from spreading to other people as easily.  Antibiotics can also prevent problems that strep throat can sometimes cause. These can happen if:   The body reacts to the infection - This can cause symptoms like skin rash, joint pain, and even organ damage. In some cases, this can be serious.   The bacteria spread to nearby areas - For example, this could cause an ear, sinus, or skin infection. It could also cause swelling or abscesses (pockets of pus) in the throat.  You  will probably be prescribed antibiotics to take for 10 days. It's important to take all of your antibiotics, even if you start to feel better sooner.  What can I do to feel better? -- Follow your doctor's instructions for taking your antibiotics.  There are also other ways to help relieve symptoms:   Take over-the-counter pain medicine - Acetaminophen (sample brand name: Tylenol) or ibuprofen (sample brand names: Advil, Motrin) can help with throat pain.   Use sore throat lozenges or sprays - Using medicated sore throat lozenges or throat sprays can temporarily reduce throat pain.   Suck on hard candies, ice chips, or ice pops.   Gargle with salt water - Some people find that this helps with throat pain.   Use a cool mist humidifier - This adds moisture to the air to keep the throat from getting too dry and might help with pain.   Avoid smoking or being around people who are smoking - Smoke can make throat pain worse.  When can I go back to work or school? -- Doctors usually recommend waiting 1 day after starting antibiotics before returning to work or school. By then, you will be a lot less likely to spread the infection to others.  What problems should I watch for? -- If strep throat is not treated with antibiotics, it can lead to other problems.  Call your doctor or nurse for advice if:   You are having trouble getting enough to eat or drink.   You still have symptoms after you finish your antibiotics.   You develop a red rash or peeling skin.   You develop joint pain within 1 month of having strep throat.   Your urine becomes red or brown.   You start having new symptoms.  What can I do to prevent getting strep throat again? -- Wash your hands often with soap and water (figure 2). This is one of the best ways to prevent the spread of infection.  All topics are updated as new evidence becomes available and our peer review process is complete.  This topic retrieved from [employer] on: Feb 26, 2024.  Topic 528149  Version 2.0  Release: 32.2.4 - C32.56  © 2024 UpToDate, Inc. and/or its affiliates. All rights reserved.  figure 1: Strep throat     Strep throat can make the roof of your mouth turn red and your tonsils white. It can also make your uvula swell.  Graphic 53767 Version 6.0  figure 2: How to wash your hands     Wet your hands with clean water, and apply a small amount of soap. Lather and rub hands together for at least 20 seconds. Clean your wrists, palms, backs of your hands, between your fingers, tips of your fingers, thumbs, and under and around your nails. Rinse well, and dry your hands using a clean towel.  Graphic 724460 Version 7.0  Consumer Information Use and Disclaimer   Disclaimer: This generalized information is a limited summary of diagnosis, treatment, and/or medication information. It is not meant to be comprehensive and should be used as a tool to help the user understand and/or assess potential diagnostic and treatment options. It does NOT include all information about conditions, treatments, medications, side effects, or risks that may apply to a specific patient. It is not intended to be medical advice or a substitute for the medical advice, diagnosis, or treatment of a health care provider based on the health care provider's examination and assessment of a patient's specific and unique circumstances. Patients must speak with a health care provider for complete information about their health, medical questions, and treatment options, including any risks or benefits regarding use of medications. This information does not endorse any treatments or medications as safe, effective, or approved for treating a specific patient. UpToDate, Inc. and its affiliates disclaim any warranty or liability relating to this information or the use thereof.The use of this information is governed by the Terms of Use, available at https://www.woltersNavarikuwer.com/en/know/clinical-effectiveness-terms. 2024© UpToDate, Inc. and  its affiliates and/or licensors. All rights reserved.  Copyright   © 2024 [employer] and/or its affiliates. All rights reserved.

## 2025-07-16 ENCOUNTER — RA CDI HCC (OUTPATIENT)
Dept: OTHER | Facility: HOSPITAL | Age: 55
End: 2025-07-16

## 2025-07-22 ENCOUNTER — OFFICE VISIT (OUTPATIENT)
Dept: FAMILY MEDICINE CLINIC | Facility: HOME HEALTHCARE | Age: 55
End: 2025-07-22
Payer: COMMERCIAL

## 2025-07-22 VITALS
TEMPERATURE: 97.8 F | OXYGEN SATURATION: 97 % | DIASTOLIC BLOOD PRESSURE: 98 MMHG | SYSTOLIC BLOOD PRESSURE: 180 MMHG | RESPIRATION RATE: 18 BRPM | HEIGHT: 65 IN | BODY MASS INDEX: 28.96 KG/M2 | HEART RATE: 82 BPM | WEIGHT: 173.8 LBS

## 2025-07-22 DIAGNOSIS — Z00.00 ANNUAL PHYSICAL EXAM: Primary | ICD-10-CM

## 2025-07-22 DIAGNOSIS — I10 BENIGN ESSENTIAL HYPERTENSION: ICD-10-CM

## 2025-07-22 DIAGNOSIS — Z11.59 NEED FOR HEPATITIS C SCREENING TEST: ICD-10-CM

## 2025-07-22 DIAGNOSIS — Z11.4 SCREENING FOR HIV (HUMAN IMMUNODEFICIENCY VIRUS): ICD-10-CM

## 2025-07-22 DIAGNOSIS — E78.2 MIXED HYPERLIPIDEMIA: ICD-10-CM

## 2025-07-22 DIAGNOSIS — Z12.11 SCREENING FOR COLON CANCER: ICD-10-CM

## 2025-07-22 DIAGNOSIS — Z12.31 ENCOUNTER FOR SCREENING MAMMOGRAM FOR BREAST CANCER: ICD-10-CM

## 2025-07-22 PROBLEM — K81.0 ACUTE CHOLECYSTITIS: Status: RESOLVED | Noted: 2023-04-30 | Resolved: 2025-07-22

## 2025-07-22 PROBLEM — E78.5 DYSLIPIDEMIA (HIGH LDL; LOW HDL): Status: RESOLVED | Noted: 2018-04-05 | Resolved: 2025-07-22

## 2025-07-22 PROCEDURE — 99396 PREV VISIT EST AGE 40-64: CPT | Performed by: PHYSICIAN ASSISTANT

## 2025-07-22 RX ORDER — METOPROLOL SUCCINATE 50 MG/1
TABLET, EXTENDED RELEASE ORAL
Qty: 90 TABLET | Refills: 1 | Status: SHIPPED | OUTPATIENT
Start: 2025-07-22

## 2025-07-22 RX ORDER — HYDROCHLOROTHIAZIDE 25 MG/1
25 TABLET ORAL DAILY
Qty: 90 TABLET | Refills: 1 | Status: SHIPPED | OUTPATIENT
Start: 2025-07-22

## 2025-07-22 RX ORDER — LISINOPRIL 40 MG/1
40 TABLET ORAL DAILY
Qty: 90 TABLET | Refills: 1 | Status: SHIPPED | OUTPATIENT
Start: 2025-07-22

## 2025-07-22 NOTE — ASSESSMENT & PLAN NOTE
Restart HTN medications as previously prescribed.  Labs as ordered.  Orders:  •  metoprolol succinate (TOPROL-XL) 50 mg 24 hr tablet; Take 1 tablet daily  •  lisinopril (ZESTRIL) 40 mg tablet; Take 1 tablet (40 mg total) by mouth daily  •  hydroCHLOROthiazide 25 mg tablet; Take 1 tablet (25 mg total) by mouth daily  •  TSH, 3rd generation with Free T4 reflex; Future  •  Comprehensive metabolic panel; Future  •  CBC and differential; Future

## 2025-07-22 NOTE — PATIENT INSTRUCTIONS
"Patient Education     Routine physical for adults   The Basics   Written by the doctors and editors at [facility]   What is a physical? -- A physical is a routine visit, or \"check-up,\" with your doctor. You might also hear it called a \"wellness visit\" or \"preventive visit.\"  During each visit, the doctor will:   Ask about your physical and mental health   Ask about your habits, behaviors, and lifestyle   Do an exam   Give you vaccines if needed   Talk to you about any medicines you take   Give advice about your health   Answer your questions  Getting regular check-ups is an important part of taking care of your health. It can help your doctor find and treat any problems you have. But it's also important for preventing health problems.  A routine physical is different from a \"sick visit.\" A sick visit is when you see a doctor because of a health concern or problem. Since physicals are scheduled ahead of time, you can think about what you want to ask the doctor.  How often should I get a physical? -- It depends on your age and health. In general, for people age 21 years and older:   If you are younger than 50 years, you might be able to get a physical every 3 years.   If you are 50 years or older, your doctor might recommend a physical every year.  If you have an ongoing health condition, like diabetes or high blood pressure, your doctor will probably want to see you more often.  What happens during a physical? -- In general, each visit will include:   Physical exam - The doctor or nurse will check your height, weight, heart rate, and blood pressure. They will also look at your eyes and ears. They will ask about how you are feeling and whether you have any symptoms that bother you.   Medicines - It's a good idea to bring a list of all the medicines you take to each doctor visit. Your doctor will talk to you about your medicines and answer any questions. Tell them if you are having any side effects that bother you. You " "should also tell them if you are having trouble paying for any of your medicines.   Habits and behaviors - This includes:   Your diet   Your exercise habits   Whether you smoke, drink alcohol, or use drugs   Whether you are sexually active   Whether you feel safe at home  Your doctor will talk to you about things you can do to improve your health and lower your risk of health problems. They will also offer help and support. For example, if you want to quit smoking, they can give you advice and might prescribe medicines. If you want to improve your diet or get more physical activity, they can help you with this, too.   Lab tests, if needed - The tests you get will depend on your age and situation. For example, your doctor might want to check your:   Cholesterol   Blood sugar   Iron level   Vaccines - The recommended vaccines will depend on your age, health, and what vaccines you already had. Vaccines are very important because they can prevent certain serious or deadly infections.   Discussion of screening - \"Screening\" means checking for diseases or other health problems before they cause symptoms. Your doctor can recommend screening based on your age, risk, and preferences. This might include tests to check for:   Cancer, such as breast, prostate, cervical, ovarian, colorectal, prostate, lung, or skin cancer   Sexually transmitted infections, such as chlamydia and gonorrhea   Mental health conditions like depression and anxiety  Your doctor will talk to you about the different types of screening tests. They can help you decide which screenings to have. They can also explain what the results might mean.   Answering questions - The physical is a good time to ask the doctor or nurse questions about your health. If needed, they can refer you to other doctors or specialists, too.  Adults older than 65 years often need other care, too. As you get older, your doctor will talk to you about:   How to prevent falling at " home   Hearing or vision tests   Memory testing   How to take your medicines safely   Making sure that you have the help and support you need at home  All topics are updated as new evidence becomes available and our peer review process is complete.  This topic retrieved from [employer] on: May 02, 2024.  Topic 218505 Version 1.0  Release: 32.4.3 - C32.122  © 2024 UpToDate, Inc. and/or its affiliates. All rights reserved.  Consumer Information Use and Disclaimer   Disclaimer: This generalized information is a limited summary of diagnosis, treatment, and/or medication information. It is not meant to be comprehensive and should be used as a tool to help the user understand and/or assess potential diagnostic and treatment options. It does NOT include all information about conditions, treatments, medications, side effects, or risks that may apply to a specific patient. It is not intended to be medical advice or a substitute for the medical advice, diagnosis, or treatment of a health care provider based on the health care provider's examination and assessment of a patient's specific and unique circumstances. Patients must speak with a health care provider for complete information about their health, medical questions, and treatment options, including any risks or benefits regarding use of medications. This information does not endorse any treatments or medications as safe, effective, or approved for treating a specific patient. UpToDate, Inc. and its affiliates disclaim any warranty or liability relating to this information or the use thereof.The use of this information is governed by the Terms of Use, available at https://www.wolters"[employer]uwer.com/en/know/clinical-effectiveness-terms. 2024© UpToDate, Inc. and its affiliates and/or licensors. All rights reserved.  Copyright   © 2024 UpToDate, Inc. and/or its affiliates. All rights reserved.

## 2025-07-22 NOTE — ASSESSMENT & PLAN NOTE
Lipid panel 7/2021 with total cholesterol 282 and . Labs as ordered.  Orders:  •  Lipid panel; Future  •  Comprehensive metabolic panel; Future

## 2025-07-22 NOTE — PROGRESS NOTES
Adult Annual Physical  Name: Chikis Marquez      : 1970      MRN: [id number]  Encounter Provider: Jaya Lopez PA-C  Encounter Date: 2025   Encounter department: [facility]    :  Assessment & Plan  Annual physical exam         Benign essential hypertension  Restart HTN medications as previously prescribed.  Labs as ordered.  Orders:  •  metoprolol succinate (TOPROL-XL) 50 mg 24 hr tablet; Take 1 tablet daily  •  lisinopril (ZESTRIL) 40 mg tablet; Take 1 tablet (40 mg total) by mouth daily  •  hydroCHLOROthiazide 25 mg tablet; Take 1 tablet (25 mg total) by mouth daily  •  TSH, 3rd generation with Free T4 reflex; Future  •  Comprehensive metabolic panel; Future  •  CBC and differential; Future    Mixed hyperlipidemia  Lipid panel 2021 with total cholesterol 282 and . Labs as ordered.  Orders:  •  Lipid panel; Future  •  Comprehensive metabolic panel; Future    Encounter for screening mammogram for breast cancer  Mammogram 2020 normal.  Repeat screening ordered.  Orders:  •  Mammo screening bilateral w 3d and cad; Future    Screening for colon cancer    Orders:  •  Cologuard    Screening for HIV (human immunodeficiency virus)    Orders:  •  HIV 1/2 AG/AB w Reflex [facility] for 2 yr old and above; Future    Need for hepatitis C screening test  Cologuard 3/2021 negative.  Repeat screening ordered.    Orders:  •  Hepatitis C Antibody; Future        Preventive Screenings:  - Diabetes Screening: risks/benefits discussed and orders placed  - Cholesterol Screening: has hyperlipidemia, risks/benefits discussed and orders placed   - Hepatitis C screening: orders placed   - HIV screening: orders placed   - Cervical cancer screening: risks/benefits discussed and orders placed   - Breast cancer screening: risks/benefits discussed and orders placed   - Colon cancer screening: risks/benefits discussed and orders placed   - Lung cancer screening: screening not indicated      Immunizations:  - Immunizations due: Prevnar 20, Tdap and Zoster (Shingrix)  - Risks/benefits immunizations discussed      Counseling/Anticipatory Guidance:  - Alcohol: discussed moderation in alcohol intake and recommendations for healthy alcohol use.   - Drug use: discussed harms of illicit drug use and how it can negatively impact mental/physical health.   - Tobacco use: discussed harms of tobacco use and management options for quitting.   - Dental health: discussed importance of regular tooth brushing, flossing, and dental visits.   - Sexual health: discussed sexually transmitted diseases, partner selection, use of condoms, avoidance of unintended pregnancy, and contraceptive alternatives.   - Diet: discussed recommendations for a healthy/well-balanced diet.   - Exercise: the importance of regular exercise/physical activity was discussed. Recommend exercise 3-5 times per week for at least 30 minutes.   - Injury prevention: discussed safety/seat belts, safety helmets, smoke detectors, carbon monoxide detectors, and smoking near bedding or upholstery.       Depression Screening and Follow-up Plan: Patient was screened for depression during today's encounter. They screened negative with a PHQ-2 score of 0.          History of Present Illness     Adult Annual Physical:  Patient presents for annual physical.     Diet and Physical Activity:  - Diet/Nutrition: no special diet.  - Exercise: no formal exercise, walking and 30-60 minutes on average.    Depression Screening:  - PHQ-2 Score: 0    General Health:  - Sleep: 7-8 hours of sleep on average and snores loudly.  - Hearing: decreased hearing left ear.  - Vision: wears glasses.  - Dental: no dental visits for > 1 year.    /GYN Health:  - Follows with GYN: no.   - Menopause: postmenopausal.   - History of STDs: no  - Contraception: menopause.      Advanced Care Planning:  - Has an advanced directive?: yes    - Has a durable medical POA?: yes      Review of  "Systems   Constitutional:  Negative for chills, diaphoresis and fever.   Eyes:  Negative for visual disturbance.   Respiratory:  Negative for cough, chest tightness, shortness of breath and wheezing.    Cardiovascular:  Positive for leg swelling. Negative for chest pain and palpitations.   Gastrointestinal:  Negative for abdominal pain, constipation, diarrhea, nausea and vomiting.   Skin:  Negative for rash and wound.   Neurological:  Negative for dizziness, syncope, weakness, light-headedness and headaches.     Medical History Reviewed by provider this encounter:  Tobacco  Allergies  Meds  Problems  Med Hx  Surg Hx  Fam Hx     .  Medications Ordered Prior to Encounter[1]   Social History[1]    Objective   BP (!) 180/98 (BP Location: Left arm, Patient Position: Sitting, Cuff Size: Large)   Pulse 82   Temp 97.8 °F (36.6 °C) (Tympanic)   Resp 18   Ht 5' 5\" (1.651 m)   Wt 78.8 kg (173 lb 12.8 oz)   LMP 09/21/2020 (Approximate)   SpO2 97%   BMI 28.92 kg/m²     Physical Exam  Vitals and nursing note reviewed.   Constitutional:       General: She is not in acute distress.     Appearance: Normal appearance.   HENT:      Head: Normocephalic and atraumatic.     Eyes:      Extraocular Movements: Extraocular movements intact.      Conjunctiva/sclera: Conjunctivae normal.      Pupils: Pupils are equal, round, and reactive to light.       Cardiovascular:      Rate and Rhythm: Normal rate and regular rhythm.      Heart sounds: Normal heart sounds. No murmur heard.  Pulmonary:      Effort: Pulmonary effort is normal. No respiratory distress.      Breath sounds: Normal breath sounds. No wheezing.     Musculoskeletal:         General: Normal range of motion.      Cervical back: Normal range of motion and neck supple.      Right lower leg: No edema.      Left lower leg: No edema.   Lymphadenopathy:      Cervical: No cervical adenopathy.     Skin:     General: Skin is warm and dry.     Neurological:      General: No " focal deficit present.      Mental Status: She is alert and oriented to person, place, and time.      Cranial Nerves: No cranial nerve deficit.      Motor: No weakness.     Psychiatric:         Mood and Affect: Mood normal.         Behavior: Behavior normal.                [1]  Current Outpatient Medications on File Prior to Visit   Medication Sig Dispense Refill   • ALPHA LIPOIC ACID-BIOTIN PO Take 600 capsules by mouth in the morning     • Ascorbic Acid 100 MG CHEW Chew 1,000 mg in the morning.     • Cholecalciferol 75 MCG (3000 UT) TABS Take 5,000 Units by mouth in the morning.     • co-enzyme Q-10 30 MG capsule Take 300 mg by mouth in the morning.     • Omega-3 Fatty Acids (fish oil) 1,000 mg Take 6,000 mg by mouth in the morning and 6,000 mg in the evening.     • Red Yeast Rice 600 MG CAPS Take 600 capsules by mouth in the morning and 600 capsules before bedtime.     • [DISCONTINUED] hydrochlorothiazide (HYDRODIURIL) 25 mg tablet Take 1 tablet (25 mg total) by mouth daily 90 tablet 1   • [DISCONTINUED] lisinopril (ZESTRIL) 40 mg tablet Take 1 tablet (40 mg total) by mouth daily 90 tablet 1   • [DISCONTINUED] metoprolol succinate (TOPROL-XL) 50 mg 24 hr tablet Take 1 tablet daily 90 tablet 1   • [DISCONTINUED] fluconazole (DIFLUCAN) 150 mg tablet Take 150 mg by mouth if needed (Patient not taking: Reported on 10/20/2024)       No current facility-administered medications on file prior to visit.   [1]  Social History  Tobacco Use   • Smoking status: Never   • Smokeless tobacco: Never   Vaping Use   • Vaping status: Never Used   Substance and Sexual Activity   • Alcohol use: Yes     Comment: social   • Drug use: No   • Sexual activity: Yes